# Patient Record
Sex: FEMALE | Race: WHITE | NOT HISPANIC OR LATINO | Employment: UNEMPLOYED | URBAN - METROPOLITAN AREA
[De-identification: names, ages, dates, MRNs, and addresses within clinical notes are randomized per-mention and may not be internally consistent; named-entity substitution may affect disease eponyms.]

---

## 2022-12-19 RX ORDER — LITHIUM CARBONATE 150 MG/1
300 CAPSULE ORAL AT BEDTIME
COMMUNITY
Start: 2022-09-07

## 2022-12-19 RX ORDER — BUPROPION HYDROCHLORIDE 300 MG/1
300 TABLET ORAL EVERY MORNING
COMMUNITY
Start: 2022-10-04

## 2022-12-19 RX ORDER — OXCARBAZEPINE 150 MG/1
150 TABLET, FILM COATED ORAL AT BEDTIME
COMMUNITY
Start: 2022-10-04

## 2022-12-19 RX ORDER — CYCLOBENZAPRINE HCL 10 MG
10 TABLET ORAL 3 TIMES DAILY PRN
Status: ON HOLD | COMMUNITY
Start: 2022-04-01 | End: 2022-12-25

## 2022-12-19 RX ORDER — LAMOTRIGINE 100 MG/1
50 TABLET ORAL AT BEDTIME
COMMUNITY
Start: 2022-12-01

## 2022-12-19 RX ORDER — DEXTROAMPHETAMINE SACCHARATE, AMPHETAMINE ASPARTATE MONOHYDRATE, DEXTROAMPHETAMINE SULFATE AND AMPHETAMINE SULFATE 5; 5; 5; 5 MG/1; MG/1; MG/1; MG/1
20 CAPSULE, EXTENDED RELEASE ORAL EVERY MORNING
COMMUNITY
Start: 2022-09-07

## 2022-12-19 RX ORDER — GABAPENTIN 300 MG/1
300 CAPSULE ORAL DAILY PRN
COMMUNITY
Start: 2021-12-03

## 2022-12-19 RX ORDER — HYDROCODONE BITARTRATE AND ACETAMINOPHEN 7.5; 325 MG/1; MG/1
1 TABLET ORAL 4 TIMES DAILY
Status: ON HOLD | COMMUNITY
Start: 2022-11-04 | End: 2022-12-25

## 2022-12-19 RX ORDER — DIAZEPAM 5 MG
TABLET ORAL
Status: ON HOLD | COMMUNITY
Start: 2022-04-19 | End: 2022-12-23

## 2022-12-19 RX ORDER — BUPROPION HYDROCHLORIDE 150 MG/1
150 TABLET ORAL EVERY MORNING
COMMUNITY
Start: 2022-12-01

## 2022-12-20 ENCOUNTER — TRANSFERRED RECORDS (OUTPATIENT)
Dept: MULTI SPECIALTY CLINIC | Facility: CLINIC | Age: 42
End: 2022-12-20

## 2022-12-20 LAB
ALT SERPL-CCNC: 24 U/L
AST SERPL-CCNC: 24 U/L (ref 14–36)
CREATININE (EXTERNAL): 0.8 MG/DL (ref 0.7–1.4)
GFR ESTIMATED (EXTERNAL): >60 ML/MIN/1.73M2
GLUCOSE (EXTERNAL): 125 MG/DL (ref 60–99)
POTASSIUM (EXTERNAL): 3.7 MMOL/L (ref 3.5–5.1)

## 2022-12-22 ENCOUNTER — HOSPITAL ENCOUNTER (INPATIENT)
Facility: CLINIC | Age: 42
LOS: 5 days | Discharge: HOME | End: 2022-12-27
Attending: ORTHOPAEDIC SURGERY | Admitting: ORTHOPAEDIC SURGERY
Payer: MEDICAID

## 2022-12-22 ENCOUNTER — ANESTHESIA EVENT (OUTPATIENT)
Dept: SURGERY | Facility: CLINIC | Age: 42
End: 2022-12-22
Payer: MEDICAID

## 2022-12-22 ENCOUNTER — ANESTHESIA (OUTPATIENT)
Dept: SURGERY | Facility: CLINIC | Age: 42
End: 2022-12-22
Payer: MEDICAID

## 2022-12-22 ENCOUNTER — APPOINTMENT (OUTPATIENT)
Dept: RADIOLOGY | Facility: CLINIC | Age: 42
End: 2022-12-22
Attending: ORTHOPAEDIC SURGERY
Payer: MEDICAID

## 2022-12-22 DIAGNOSIS — M48.02 CERVICAL STENOSIS OF SPINAL CANAL: Primary | ICD-10-CM

## 2022-12-22 PROCEDURE — 278N000051 HC OR IMPLANT GENERAL: Performed by: ORTHOPAEDIC SURGERY

## 2022-12-22 PROCEDURE — 120N000001 HC R&B MED SURG/OB

## 2022-12-22 PROCEDURE — 999N000182 XR SURGERY CARM FLUORO GREATER THAN 5 MIN: Mod: TC

## 2022-12-22 PROCEDURE — 370N000017 HC ANESTHESIA TECHNICAL FEE, PER MIN: Performed by: ORTHOPAEDIC SURGERY

## 2022-12-22 PROCEDURE — 250N000005 HC OR RX SURGIFLO HEMOSTATIC MATRIX 10ML 199102S OPNP: Performed by: ORTHOPAEDIC SURGERY

## 2022-12-22 PROCEDURE — 250N000011 HC RX IP 250 OP 636

## 2022-12-22 PROCEDURE — 250N000011 HC RX IP 250 OP 636: Performed by: NURSE ANESTHETIST, CERTIFIED REGISTERED

## 2022-12-22 PROCEDURE — 00NW0ZZ RELEASE CERVICAL SPINAL CORD, OPEN APPROACH: ICD-10-PCS | Performed by: ORTHOPAEDIC SURGERY

## 2022-12-22 PROCEDURE — 250N000025 HC SEVOFLURANE, PER MIN: Performed by: ORTHOPAEDIC SURGERY

## 2022-12-22 PROCEDURE — L8699 PROSTHETIC IMPLANT NOS: HCPCS | Performed by: ORTHOPAEDIC SURGERY

## 2022-12-22 PROCEDURE — 250N000009 HC RX 250: Performed by: ANESTHESIOLOGY

## 2022-12-22 PROCEDURE — 999N000141 HC STATISTIC PRE-PROCEDURE NURSING ASSESSMENT: Performed by: ORTHOPAEDIC SURGERY

## 2022-12-22 PROCEDURE — 0RT30ZZ RESECTION OF CERVICAL VERTEBRAL DISC, OPEN APPROACH: ICD-10-PCS | Performed by: ORTHOPAEDIC SURGERY

## 2022-12-22 PROCEDURE — 250N000009 HC RX 250: Performed by: NURSE ANESTHETIST, CERTIFIED REGISTERED

## 2022-12-22 PROCEDURE — 250N000013 HC RX MED GY IP 250 OP 250 PS 637

## 2022-12-22 PROCEDURE — 272N000001 HC OR GENERAL SUPPLY STERILE: Performed by: ORTHOPAEDIC SURGERY

## 2022-12-22 PROCEDURE — 710N000010 HC RECOVERY PHASE 1, LEVEL 2, PER MIN: Performed by: ORTHOPAEDIC SURGERY

## 2022-12-22 PROCEDURE — 250N000009 HC RX 250: Performed by: ORTHOPAEDIC SURGERY

## 2022-12-22 PROCEDURE — 01N10ZZ RELEASE CERVICAL NERVE, OPEN APPROACH: ICD-10-PCS | Performed by: ORTHOPAEDIC SURGERY

## 2022-12-22 PROCEDURE — 0RG10K0 FUSION OF CERVICAL VERTEBRAL JOINT WITH NONAUTOLOGOUS TISSUE SUBSTITUTE, ANTERIOR APPROACH, ANTERIOR COLUMN, OPEN APPROACH: ICD-10-PCS | Performed by: ORTHOPAEDIC SURGERY

## 2022-12-22 PROCEDURE — 258N000003 HC RX IP 258 OP 636: Performed by: NURSE ANESTHETIST, CERTIFIED REGISTERED

## 2022-12-22 PROCEDURE — C1713 ANCHOR/SCREW BN/BN,TIS/BN: HCPCS | Performed by: ORTHOPAEDIC SURGERY

## 2022-12-22 PROCEDURE — 258N000003 HC RX IP 258 OP 636: Performed by: ANESTHESIOLOGY

## 2022-12-22 PROCEDURE — 250N000009 HC RX 250

## 2022-12-22 PROCEDURE — 258N000003 HC RX IP 258 OP 636

## 2022-12-22 PROCEDURE — 250N000011 HC RX IP 250 OP 636: Performed by: ANESTHESIOLOGY

## 2022-12-22 PROCEDURE — 360N000084 HC SURGERY LEVEL 4 W/ FLUORO, PER MIN: Performed by: ORTHOPAEDIC SURGERY

## 2022-12-22 DEVICE — I-FACTOR PUTTY, 1.0CC SYRINGE
Type: IMPLANTABLE DEVICE | Site: SPINE CERVICAL | Status: FUNCTIONAL
Brand: I-FACTOR PEPTIDE ENHANCED BONE GRAFT

## 2022-12-22 DEVICE — IMPLANTABLE DEVICE: Type: IMPLANTABLE DEVICE | Site: SPINE CERVICAL | Status: FUNCTIONAL

## 2022-12-22 RX ORDER — SCOLOPAMINE TRANSDERMAL SYSTEM 1 MG/1
1 PATCH, EXTENDED RELEASE TRANSDERMAL
Status: DISCONTINUED | OUTPATIENT
Start: 2022-12-22 | End: 2022-12-22 | Stop reason: HOSPADM

## 2022-12-22 RX ORDER — SODIUM CHLORIDE, SODIUM LACTATE, POTASSIUM CHLORIDE, CALCIUM CHLORIDE 600; 310; 30; 20 MG/100ML; MG/100ML; MG/100ML; MG/100ML
INJECTION, SOLUTION INTRAVENOUS CONTINUOUS
Status: DISCONTINUED | OUTPATIENT
Start: 2022-12-22 | End: 2022-12-22 | Stop reason: HOSPADM

## 2022-12-22 RX ORDER — DEXAMETHASONE SODIUM PHOSPHATE 10 MG/ML
INJECTION, SOLUTION INTRAMUSCULAR; INTRAVENOUS PRN
Status: DISCONTINUED | OUTPATIENT
Start: 2022-12-22 | End: 2022-12-22

## 2022-12-22 RX ORDER — ACETAMINOPHEN 325 MG/1
650 TABLET ORAL EVERY 4 HOURS PRN
Status: DISCONTINUED | OUTPATIENT
Start: 2022-12-25 | End: 2022-12-25

## 2022-12-22 RX ORDER — OXYCODONE HYDROCHLORIDE 5 MG/1
10 TABLET ORAL EVERY 4 HOURS PRN
Status: DISCONTINUED | OUTPATIENT
Start: 2022-12-22 | End: 2022-12-23

## 2022-12-22 RX ORDER — FENTANYL CITRATE 50 UG/ML
25 INJECTION, SOLUTION INTRAMUSCULAR; INTRAVENOUS EVERY 5 MIN PRN
Status: DISCONTINUED | OUTPATIENT
Start: 2022-12-22 | End: 2022-12-22 | Stop reason: HOSPADM

## 2022-12-22 RX ORDER — HYDROMORPHONE HCL IN WATER/PF 6 MG/30 ML
0.2 PATIENT CONTROLLED ANALGESIA SYRINGE INTRAVENOUS
Status: DISCONTINUED | OUTPATIENT
Start: 2022-12-22 | End: 2022-12-27 | Stop reason: HOSPADM

## 2022-12-22 RX ORDER — METHOCARBAMOL 750 MG/1
750 TABLET, FILM COATED ORAL EVERY 6 HOURS PRN
Status: DISCONTINUED | OUTPATIENT
Start: 2022-12-22 | End: 2022-12-27 | Stop reason: HOSPADM

## 2022-12-22 RX ORDER — ONDANSETRON 2 MG/ML
4 INJECTION INTRAMUSCULAR; INTRAVENOUS EVERY 6 HOURS PRN
Status: DISCONTINUED | OUTPATIENT
Start: 2022-12-22 | End: 2022-12-27 | Stop reason: HOSPADM

## 2022-12-22 RX ORDER — NALOXONE HYDROCHLORIDE 0.4 MG/ML
0.2 INJECTION, SOLUTION INTRAMUSCULAR; INTRAVENOUS; SUBCUTANEOUS
Status: DISCONTINUED | OUTPATIENT
Start: 2022-12-22 | End: 2022-12-27 | Stop reason: HOSPADM

## 2022-12-22 RX ORDER — MEPERIDINE HYDROCHLORIDE 25 MG/ML
12.5 INJECTION INTRAMUSCULAR; INTRAVENOUS; SUBCUTANEOUS
Status: DISCONTINUED | OUTPATIENT
Start: 2022-12-22 | End: 2022-12-22 | Stop reason: HOSPADM

## 2022-12-22 RX ORDER — FENTANYL CITRATE 50 UG/ML
25 INJECTION, SOLUTION INTRAMUSCULAR; INTRAVENOUS
Status: DISCONTINUED | OUTPATIENT
Start: 2022-12-22 | End: 2022-12-22 | Stop reason: HOSPADM

## 2022-12-22 RX ORDER — ONDANSETRON 2 MG/ML
4 INJECTION INTRAMUSCULAR; INTRAVENOUS EVERY 30 MIN PRN
Status: DISCONTINUED | OUTPATIENT
Start: 2022-12-22 | End: 2022-12-22 | Stop reason: HOSPADM

## 2022-12-22 RX ORDER — FENTANYL CITRATE 50 UG/ML
INJECTION, SOLUTION INTRAMUSCULAR; INTRAVENOUS PRN
Status: DISCONTINUED | OUTPATIENT
Start: 2022-12-22 | End: 2022-12-22

## 2022-12-22 RX ORDER — FENTANYL CITRATE 50 UG/ML
50 INJECTION, SOLUTION INTRAMUSCULAR; INTRAVENOUS EVERY 5 MIN PRN
Status: DISCONTINUED | OUTPATIENT
Start: 2022-12-22 | End: 2022-12-22 | Stop reason: HOSPADM

## 2022-12-22 RX ORDER — PROPOFOL 10 MG/ML
INJECTION, EMULSION INTRAVENOUS PRN
Status: DISCONTINUED | OUTPATIENT
Start: 2022-12-22 | End: 2022-12-22

## 2022-12-22 RX ORDER — GABAPENTIN 300 MG/1
300 CAPSULE ORAL
Status: COMPLETED | OUTPATIENT
Start: 2022-12-22 | End: 2022-12-22

## 2022-12-22 RX ORDER — CEFAZOLIN SODIUM/WATER 2 G/20 ML
2 SYRINGE (ML) INTRAVENOUS
Status: COMPLETED | OUTPATIENT
Start: 2022-12-22 | End: 2022-12-22

## 2022-12-22 RX ORDER — SODIUM CHLORIDE 9 MG/ML
INJECTION, SOLUTION INTRAVENOUS CONTINUOUS
Status: DISCONTINUED | OUTPATIENT
Start: 2022-12-22 | End: 2022-12-27 | Stop reason: HOSPADM

## 2022-12-22 RX ORDER — BISACODYL 10 MG
10 SUPPOSITORY, RECTAL RECTAL DAILY PRN
Status: DISCONTINUED | OUTPATIENT
Start: 2022-12-22 | End: 2022-12-27 | Stop reason: HOSPADM

## 2022-12-22 RX ORDER — ACETAMINOPHEN 325 MG/1
975 TABLET ORAL ONCE
Status: COMPLETED | OUTPATIENT
Start: 2022-12-22 | End: 2022-12-22

## 2022-12-22 RX ORDER — OXYCODONE HYDROCHLORIDE 5 MG/1
5 TABLET ORAL EVERY 4 HOURS PRN
Status: DISCONTINUED | OUTPATIENT
Start: 2022-12-22 | End: 2022-12-23

## 2022-12-22 RX ORDER — ONDANSETRON 4 MG/1
4 TABLET, ORALLY DISINTEGRATING ORAL EVERY 6 HOURS PRN
Status: DISCONTINUED | OUTPATIENT
Start: 2022-12-22 | End: 2022-12-27 | Stop reason: HOSPADM

## 2022-12-22 RX ORDER — LIDOCAINE HYDROCHLORIDE 10 MG/ML
INJECTION, SOLUTION INFILTRATION; PERINEURAL PRN
Status: DISCONTINUED | OUTPATIENT
Start: 2022-12-22 | End: 2022-12-22

## 2022-12-22 RX ORDER — PROCHLORPERAZINE MALEATE 10 MG
10 TABLET ORAL EVERY 6 HOURS PRN
Status: DISCONTINUED | OUTPATIENT
Start: 2022-12-22 | End: 2022-12-27 | Stop reason: HOSPADM

## 2022-12-22 RX ORDER — ACETAMINOPHEN 325 MG/1
975 TABLET ORAL EVERY 8 HOURS
Status: COMPLETED | OUTPATIENT
Start: 2022-12-22 | End: 2022-12-25

## 2022-12-22 RX ORDER — NALOXONE HYDROCHLORIDE 0.4 MG/ML
0.4 INJECTION, SOLUTION INTRAMUSCULAR; INTRAVENOUS; SUBCUTANEOUS
Status: DISCONTINUED | OUTPATIENT
Start: 2022-12-22 | End: 2022-12-27 | Stop reason: HOSPADM

## 2022-12-22 RX ORDER — HYDROMORPHONE HCL IN WATER/PF 6 MG/30 ML
0.4 PATIENT CONTROLLED ANALGESIA SYRINGE INTRAVENOUS
Status: DISCONTINUED | OUTPATIENT
Start: 2022-12-22 | End: 2022-12-23

## 2022-12-22 RX ORDER — ONDANSETRON 2 MG/ML
INJECTION INTRAMUSCULAR; INTRAVENOUS PRN
Status: DISCONTINUED | OUTPATIENT
Start: 2022-12-22 | End: 2022-12-22

## 2022-12-22 RX ORDER — HYDROMORPHONE HCL IN WATER/PF 6 MG/30 ML
0.4 PATIENT CONTROLLED ANALGESIA SYRINGE INTRAVENOUS EVERY 5 MIN PRN
Status: DISCONTINUED | OUTPATIENT
Start: 2022-12-22 | End: 2022-12-22 | Stop reason: HOSPADM

## 2022-12-22 RX ORDER — CEFAZOLIN SODIUM 1 G/3ML
1 INJECTION, POWDER, FOR SOLUTION INTRAMUSCULAR; INTRAVENOUS EVERY 8 HOURS
Status: COMPLETED | OUTPATIENT
Start: 2022-12-22 | End: 2022-12-23

## 2022-12-22 RX ORDER — ONDANSETRON 4 MG/1
4 TABLET, ORALLY DISINTEGRATING ORAL EVERY 30 MIN PRN
Status: DISCONTINUED | OUTPATIENT
Start: 2022-12-22 | End: 2022-12-22 | Stop reason: HOSPADM

## 2022-12-22 RX ORDER — DEXMEDETOMIDINE HYDROCHLORIDE 4 UG/ML
.2-1 INJECTION, SOLUTION INTRAVENOUS CONTINUOUS
Status: DISCONTINUED | OUTPATIENT
Start: 2022-12-22 | End: 2022-12-22 | Stop reason: HOSPADM

## 2022-12-22 RX ORDER — HYDROMORPHONE HCL IN WATER/PF 6 MG/30 ML
0.2 PATIENT CONTROLLED ANALGESIA SYRINGE INTRAVENOUS EVERY 5 MIN PRN
Status: DISCONTINUED | OUTPATIENT
Start: 2022-12-22 | End: 2022-12-22 | Stop reason: HOSPADM

## 2022-12-22 RX ORDER — AMOXICILLIN 250 MG
1 CAPSULE ORAL 2 TIMES DAILY
Status: DISCONTINUED | OUTPATIENT
Start: 2022-12-22 | End: 2022-12-27 | Stop reason: HOSPADM

## 2022-12-22 RX ORDER — PROPOFOL 10 MG/ML
INJECTION, EMULSION INTRAVENOUS CONTINUOUS PRN
Status: DISCONTINUED | OUTPATIENT
Start: 2022-12-22 | End: 2022-12-22

## 2022-12-22 RX ORDER — MAGNESIUM SULFATE 4 G/50ML
4 INJECTION INTRAVENOUS ONCE
Status: COMPLETED | OUTPATIENT
Start: 2022-12-22 | End: 2022-12-22

## 2022-12-22 RX ORDER — LIDOCAINE 40 MG/G
CREAM TOPICAL
Status: DISCONTINUED | OUTPATIENT
Start: 2022-12-22 | End: 2022-12-22 | Stop reason: HOSPADM

## 2022-12-22 RX ORDER — MULTIVITAMIN,THERAPEUTIC
1 TABLET ORAL DAILY
Status: DISCONTINUED | OUTPATIENT
Start: 2022-12-23 | End: 2022-12-27 | Stop reason: HOSPADM

## 2022-12-22 RX ORDER — HYDROXYZINE HYDROCHLORIDE 25 MG/1
25 TABLET, FILM COATED ORAL EVERY 6 HOURS PRN
Status: DISCONTINUED | OUTPATIENT
Start: 2022-12-22 | End: 2022-12-27 | Stop reason: HOSPADM

## 2022-12-22 RX ORDER — POLYETHYLENE GLYCOL 3350 17 G/17G
17 POWDER, FOR SOLUTION ORAL DAILY
Status: DISCONTINUED | OUTPATIENT
Start: 2022-12-23 | End: 2022-12-27 | Stop reason: HOSPADM

## 2022-12-22 RX ORDER — LORATADINE 10 MG/1
10 TABLET ORAL DAILY PRN
Status: DISCONTINUED | OUTPATIENT
Start: 2022-12-22 | End: 2022-12-27 | Stop reason: HOSPADM

## 2022-12-22 RX ORDER — CEFAZOLIN SODIUM/WATER 2 G/20 ML
2 SYRINGE (ML) INTRAVENOUS SEE ADMIN INSTRUCTIONS
Status: DISCONTINUED | OUTPATIENT
Start: 2022-12-22 | End: 2022-12-22 | Stop reason: HOSPADM

## 2022-12-22 RX ADMIN — PHENYLEPHRINE HYDROCHLORIDE 100 MCG: 10 INJECTION INTRAVENOUS at 13:31

## 2022-12-22 RX ADMIN — SENNOSIDES AND DOCUSATE SODIUM 1 TABLET: 50; 8.6 TABLET ORAL at 20:15

## 2022-12-22 RX ADMIN — ACETAMINOPHEN 975 MG: 325 TABLET ORAL at 10:20

## 2022-12-22 RX ADMIN — GABAPENTIN 300 MG: 300 CAPSULE ORAL at 10:20

## 2022-12-22 RX ADMIN — ROCURONIUM BROMIDE 40 MG: 10 INJECTION, SOLUTION INTRAVENOUS at 12:47

## 2022-12-22 RX ADMIN — DEXAMETHASONE SODIUM PHOSPHATE 10 MG: 10 INJECTION, SOLUTION INTRAMUSCULAR; INTRAVENOUS at 12:47

## 2022-12-22 RX ADMIN — MIDAZOLAM 2 MG: 1 INJECTION INTRAMUSCULAR; INTRAVENOUS at 12:40

## 2022-12-22 RX ADMIN — OXYCODONE HYDROCHLORIDE 5 MG: 5 TABLET ORAL at 21:29

## 2022-12-22 RX ADMIN — FENTANYL CITRATE 50 MCG: 50 INJECTION, SOLUTION INTRAMUSCULAR; INTRAVENOUS at 15:19

## 2022-12-22 RX ADMIN — HYDROMORPHONE HYDROCHLORIDE 0.4 MG: 0.2 INJECTION, SOLUTION INTRAMUSCULAR; INTRAVENOUS; SUBCUTANEOUS at 23:42

## 2022-12-22 RX ADMIN — ACETAMINOPHEN 975 MG: 325 TABLET ORAL at 17:29

## 2022-12-22 RX ADMIN — SCOPALAMINE 1 PATCH: 1 PATCH, EXTENDED RELEASE TRANSDERMAL at 10:44

## 2022-12-22 RX ADMIN — FENTANYL CITRATE 50 MCG: 50 INJECTION, SOLUTION INTRAMUSCULAR; INTRAVENOUS at 15:41

## 2022-12-22 RX ADMIN — ROCURONIUM BROMIDE 30 MG: 10 INJECTION, SOLUTION INTRAVENOUS at 13:16

## 2022-12-22 RX ADMIN — TRANEXAMIC ACID 1 G: 1 INJECTION, SOLUTION INTRAVENOUS at 12:55

## 2022-12-22 RX ADMIN — FENTANYL CITRATE 25 MCG: 50 INJECTION, SOLUTION INTRAMUSCULAR; INTRAVENOUS at 15:29

## 2022-12-22 RX ADMIN — SODIUM CHLORIDE, POTASSIUM CHLORIDE, SODIUM LACTATE AND CALCIUM CHLORIDE: 600; 310; 30; 20 INJECTION, SOLUTION INTRAVENOUS at 13:40

## 2022-12-22 RX ADMIN — HYDROMORPHONE HYDROCHLORIDE 0.5 MG: 1 INJECTION, SOLUTION INTRAMUSCULAR; INTRAVENOUS; SUBCUTANEOUS at 14:16

## 2022-12-22 RX ADMIN — ROCURONIUM BROMIDE 30 MG: 10 INJECTION, SOLUTION INTRAVENOUS at 14:00

## 2022-12-22 RX ADMIN — PHENYLEPHRINE HYDROCHLORIDE 100 MCG: 10 INJECTION INTRAVENOUS at 13:33

## 2022-12-22 RX ADMIN — SODIUM CHLORIDE: 9 INJECTION, SOLUTION INTRAVENOUS at 17:32

## 2022-12-22 RX ADMIN — CEFAZOLIN 1 G: 1 INJECTION, POWDER, FOR SOLUTION INTRAMUSCULAR; INTRAVENOUS at 20:19

## 2022-12-22 RX ADMIN — FENTANYL CITRATE 100 MCG: 50 INJECTION, SOLUTION INTRAMUSCULAR; INTRAVENOUS at 12:47

## 2022-12-22 RX ADMIN — Medication 2 G: at 12:40

## 2022-12-22 RX ADMIN — HYDROMORPHONE HYDROCHLORIDE 0.4 MG: 0.2 INJECTION, SOLUTION INTRAMUSCULAR; INTRAVENOUS; SUBCUTANEOUS at 19:09

## 2022-12-22 RX ADMIN — HYDROMORPHONE HYDROCHLORIDE 0.4 MG: 0.2 INJECTION, SOLUTION INTRAMUSCULAR; INTRAVENOUS; SUBCUTANEOUS at 15:51

## 2022-12-22 RX ADMIN — PHENYLEPHRINE HYDROCHLORIDE 100 MCG: 10 INJECTION INTRAVENOUS at 13:37

## 2022-12-22 RX ADMIN — DEXMEDETOMIDINE HYDROCHLORIDE 0.4 MCG/KG/HR: 400 INJECTION INTRAVENOUS at 12:47

## 2022-12-22 RX ADMIN — HYDROMORPHONE HYDROCHLORIDE 0.5 MG: 1 INJECTION, SOLUTION INTRAMUSCULAR; INTRAVENOUS; SUBCUTANEOUS at 14:30

## 2022-12-22 RX ADMIN — MAGNESIUM SULFATE HEPTAHYDRATE 4 G: 4 INJECTION, SOLUTION INTRAVENOUS at 10:40

## 2022-12-22 RX ADMIN — LIDOCAINE HYDROCHLORIDE 20 MG: 10 INJECTION, SOLUTION INFILTRATION; PERINEURAL at 12:47

## 2022-12-22 RX ADMIN — PROPOFOL 150 MG: 10 INJECTION, EMULSION INTRAVENOUS at 12:47

## 2022-12-22 RX ADMIN — SODIUM CHLORIDE, POTASSIUM CHLORIDE, SODIUM LACTATE AND CALCIUM CHLORIDE: 600; 310; 30; 20 INJECTION, SOLUTION INTRAVENOUS at 10:32

## 2022-12-22 RX ADMIN — ONDANSETRON 4 MG: 2 INJECTION INTRAMUSCULAR; INTRAVENOUS at 14:30

## 2022-12-22 RX ADMIN — PROPOFOL 50 MCG/KG/MIN: 10 INJECTION, EMULSION INTRAVENOUS at 12:55

## 2022-12-22 RX ADMIN — BENZOCAINE AND MENTHOL 1 LOZENGE: 15; 3.6 LOZENGE ORAL at 21:31

## 2022-12-22 RX ADMIN — PHENYLEPHRINE HYDROCHLORIDE 100 MCG: 10 INJECTION INTRAVENOUS at 13:35

## 2022-12-22 RX ADMIN — PHENYLEPHRINE HYDROCHLORIDE 0.3 MCG/KG/MIN: 10 INJECTION INTRAVENOUS at 13:38

## 2022-12-22 ASSESSMENT — ACTIVITIES OF DAILY LIVING (ADL)
ADLS_ACUITY_SCORE: 18
ADLS_ACUITY_SCORE: 33

## 2022-12-22 NOTE — ANESTHESIA POSTPROCEDURE EVALUATION
Patient: Yane Delgado    Procedure: Procedure(s):  CERVICAL 5-6 ANTERIOR CERVICAL DECOMPRESSION AND FUSION       Anesthesia Type:  General    Note:  Disposition: Outpatient   Postop Pain Control: Uneventful            Sign Out: Well controlled pain   PONV: No   Neuro/Psych: Uneventful            Sign Out: Acceptable/Baseline neuro status   Airway/Respiratory: Uneventful            Sign Out: Acceptable/Baseline resp. status   CV/Hemodynamics: Uneventful            Sign Out: Acceptable CV status; No obvious hypovolemia; No obvious fluid overload   Other NRE: NONE   DID A NON-ROUTINE EVENT OCCUR? No           Last vitals:  Vitals Value Taken Time   /59 12/22/22 1605   Temp 36.4  C (97.6  F) 12/22/22 1600   Pulse 92 12/22/22 1608   Resp 18 12/22/22 1606   SpO2 96 % 12/22/22 1608   Vitals shown include unvalidated device data.    Electronically Signed By: Robbie Diallo MD  December 22, 2022  4:42 PM

## 2022-12-22 NOTE — ANESTHESIA CARE TRANSFER NOTE
Patient: Yane Delgado    Procedure: Procedure(s):  CERVICAL 5-6 ANTERIOR CERVICAL DECOMPRESSION AND FUSION       Diagnosis: Cervical radicular pain [M54.12]  Diagnosis Additional Information: No value filed.    Anesthesia Type:   General     Note:    Oropharynx: oropharynx clear of all foreign objects  Level of Consciousness: awake  Oxygen Supplementation: face mask  Level of Supplemental Oxygen (L/min / FiO2): 6  Independent Airway: airway patency satisfactory and stable  Dentition: dentition unchanged  Vital Signs Stable: post-procedure vital signs reviewed and stable  Report to RN Given: handoff report given  Patient transferred to: PACU    Handoff Report: Identifed the Patient, Identified the Reponsible Provider, Reviewed the pertinent medical history, Discussed the surgical course, Reviewed Intra-OP anesthesia mangement and issues during anesthesia, Set expectations for post-procedure period and Allowed opportunity for questions and acknowledgement of understanding      Vitals:  Vitals Value Taken Time   /58 12/22/22 1450   Temp 36.4  C (97.6  F) 12/22/22 1449   Pulse 90 12/22/22 1450   Resp 15 12/22/22 1450   SpO2 98 % 12/22/22 1450   Vitals shown include unvalidated device data.    Electronically Signed By: JULIET Daigle CRNA  December 22, 2022  2:52 PM

## 2022-12-22 NOTE — ANESTHESIA PROCEDURE NOTES
Airway       Patient location during procedure: OR       Procedure Start/Stop Times: 12/22/2022 12:50 PM and 12/22/2022 1:52 PM  Staff -        CRNA: Chris Herndon APRN CRNA       Performed By: CRNA  Consent for Airway        Urgency: elective  Indications and Patient Condition       Indications for airway management: nikky-procedural       Induction type:intravenous       Mask difficulty assessment: 1 - vent by mask    Final Airway Details       Final airway type: endotracheal airway       Successful airway: ETT - single  Endotracheal Airway Details        ETT size (mm): 7.0       Cuffed: yes       Successful intubation technique: direct laryngoscopy       DL Blade Type: Wyatt 2       Grade View of Cords: 1       Adjucts: stylet       Position: Right       Measured from: lips       Secured at (cm): 22       Bite block used: Soft    Post intubation assessment        Placement verified by: capnometry, equal breath sounds and chest rise        Number of attempts at approach: 1       Secured with: silk tape       Ease of procedure: easy       Dentition: Dental injury       Dental guard used and removed. Dental Guard Type: Proguard Red.    Medication(s) Administered   Medication Administration Time: 12/22/2022 12:50 PM
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
## 2022-12-22 NOTE — OP NOTE
DATE OF SERVICE: 12/22/2022        PREOPERATIVE DIAGNOSES:  1.  Multilevel degenerative disk disease cervical spine.  2.  Severe right foraminal stenosis at C5-6  3.  Failure of conservative measures.     POSTOPERATIVE DIAGNOSES:  Same     PROCEDURES:  1.  Left-sided Foster Frank anterior approach to cervical spine  2.  Complete block diskectomy at C5-6 with bilateral uncovertebral resections and complete decompression of all exiting nerves and the spinal cord.  3.  Anterior cervical fusion C5-6.  4.  Placement of a Saber-C graft 6 mm x 12 mm x 15 mm into the disk space at C5-6  5.  Placement of separate fixation plate at C5-6.  This serves as a separate plate code ([id number])     SURGEON:  Dr. Tera Angeles     ASSISTANT:  Elizabet Ellington PA-C who was needed for patient positioning, soft tissue retraction, patient safety and assistance with closure of the wound.  She was vital in the protection of the vessels, esophagus, and other soft tissues.       ESTIMATED BLOOD LOSS:  10 mL.     DRAINS:  None.     COMPLICATIONS:  None perceived.     SPECIMENS SENT:  None.     HISTORY OF PRESENT ILLNESS AND INDICATIONS FOR PROCEDURE:  This is a pleasant 42 year old female with signs and symptoms of severe right shoulder and arm pain.  She elected to proceed with surgery.          Therefore, the patient was seen in the preop area of [facility] today. The   neck was marked and the consent was again reverified.  She was brought to the operating room, intubated via general endotracheal anesthetic and placed on a flat top table with care taken to pad all bony prominences.  Pause for the Cause was performed correctly identifying the patient, procedure, proposed plan and radiographs and all were in agreement.  We then localized our incision so as not to cause any iatrogenic tissue damage and performed a left-sided Foster    Frank anterior approach to cervical spine.  We dissected down over the C5 and C6  vertebral  bodies taking care to protect the disk spaces above.  We then placed  our self-retaining retractors and began with our complete block diskectomy at C5-6.   There was a severe amount of foraminal stenosis on the right.  We removed this and  dissected all the way back to the level of the spinal cord behind the posterior longitudinal ligament.  We made certain there was no continued neural compression. We then gently decorticated the endplates making sure not to dig too deeply so that we would make certain to avoid any subsidence.  We then placed the Saber-C graft 6 mm height cortical cancellous allograft.  Separate plate construct was placed over the top of titanium spikes.       The patient tolerated procedure well.  There were no apparent complications.  Patient will be weightbearing as tolerated bilateral lower extremities with strict instructions to avoid lifting more than a full gallon of milk over the next 6 weeks.  She will have early mobilization and HALINA stockings for DVT prophylaxis and 2 grams of Ancef IV q.8 hours x2 doses for antibiotics. Return to see me in 6 weeks, sooner if there are any problems, questions or concerns.  She can have a soft collar for her comfort.

## 2022-12-22 NOTE — PHARMACY-ADMISSION MEDICATION HISTORY
Pharmacy Note - Admission Medication History    Pertinent Provider Information:    ______________________________________________________________________    Prior To Admission (PTA) med list completed and updated in EMR.       PTA Med List   Medication Sig Last Dose     amphetamine-dextroamphetamine (ADDERALL XR) 20 MG 24 hr capsule Take 20 mg by mouth every morning 12/21/2022 at am     buPROPion (WELLBUTRIN XL) 150 MG 24 hr tablet Take 150 mg by mouth every morning Give with 300mg tab for total dose of 450mg 12/22/2022 at 0630     buPROPion (WELLBUTRIN XL) 300 MG 24 hr tablet 300 mg every morning Give with 150mg tab for total dose of 450mg 12/22/2022 at 0630     cyclobenzaprine (FLEXERIL) 10 MG tablet Take 10 mg by mouth 3 times daily as needed 12/18/2022     diazepam (VALIUM) 5 MG tablet Take 1 tab 60 minutes before procedure.  If no affect 15 minutes before MRI, take 2nd tab More than a month     gabapentin (NEURONTIN) 300 MG capsule Take 300 mg by mouth daily as needed 12/15/2022     HYDROcodone-acetaminophen (NORCO) 7.5-325 MG per tablet Take 1 tablet by mouth 4 times daily 12/22/2022 at 0630     lamoTRIgine (LAMICTAL) 100 MG tablet Take 50 mg by mouth At Bedtime 12/21/2022 at pm     lithium (ESKALITH) 150 MG capsule Take 300 mg by mouth At Bedtime 12/19/2022     norgestrel-ethinyl estradiol (LO/OVRAL) 0.3-30 MG-MCG tablet Take 1 tablet by mouth daily 12/21/2022 at pm     OXcarbazepine (TRILEPTAL) 150 MG tablet Take 150 mg by mouth At Bedtime 12/21/2022 at pm       Information source(s): Patient and CareEverywhere/SureScripts    Method of interview communication: in-person    Patient was asked about OTC/herbal products specifically.  PTA med list reflects this.    Based on the pharmacist's assessment, the PTA med list information appears reliable    Allergies were reviewed, assessed, and updated with the patient.      Patient does not anticipate needing any multi-use medications during admission.     Thank you  for the opportunity to participate in the care of this patient.      Amy Willams, Pharm D, BCPS     12/22/2022     10:29 AM

## 2022-12-23 ENCOUNTER — APPOINTMENT (OUTPATIENT)
Dept: PHYSICAL THERAPY | Facility: CLINIC | Age: 42
End: 2022-12-23
Payer: MEDICAID

## 2022-12-23 LAB — HGB BLD-MCNC: 11.2 G/DL (ref 11.7–15.7)

## 2022-12-23 PROCEDURE — 85018 HEMOGLOBIN: CPT

## 2022-12-23 PROCEDURE — 97161 PT EVAL LOW COMPLEX 20 MIN: CPT | Mod: GP

## 2022-12-23 PROCEDURE — 36415 COLL VENOUS BLD VENIPUNCTURE: CPT

## 2022-12-23 PROCEDURE — 97116 GAIT TRAINING THERAPY: CPT | Mod: GP

## 2022-12-23 PROCEDURE — 250N000011 HC RX IP 250 OP 636

## 2022-12-23 PROCEDURE — 250N000013 HC RX MED GY IP 250 OP 250 PS 637

## 2022-12-23 PROCEDURE — 120N000001 HC R&B MED SURG/OB

## 2022-12-23 PROCEDURE — 250N000013 HC RX MED GY IP 250 OP 250 PS 637: Performed by: ORTHOPAEDIC SURGERY

## 2022-12-23 RX ORDER — LITHIUM CARBONATE 150 MG/1
300 CAPSULE ORAL AT BEDTIME
Status: DISCONTINUED | OUTPATIENT
Start: 2022-12-23 | End: 2022-12-27 | Stop reason: HOSPADM

## 2022-12-23 RX ORDER — BUPROPION HYDROCHLORIDE 150 MG/1
150 TABLET ORAL EVERY MORNING
Status: DISCONTINUED | OUTPATIENT
Start: 2022-12-23 | End: 2022-12-27 | Stop reason: HOSPADM

## 2022-12-23 RX ORDER — LAMOTRIGINE 100 MG/1
50 TABLET ORAL AT BEDTIME
Status: DISCONTINUED | OUTPATIENT
Start: 2022-12-23 | End: 2022-12-27 | Stop reason: HOSPADM

## 2022-12-23 RX ORDER — DEXTROAMPHETAMINE SACCHARATE, AMPHETAMINE ASPARTATE MONOHYDRATE, DEXTROAMPHETAMINE SULFATE AND AMPHETAMINE SULFATE 2.5; 2.5; 2.5; 2.5 MG/1; MG/1; MG/1; MG/1
20 CAPSULE, EXTENDED RELEASE ORAL EVERY MORNING
Status: DISCONTINUED | OUTPATIENT
Start: 2022-12-24 | End: 2022-12-27 | Stop reason: HOSPADM

## 2022-12-23 RX ORDER — OXCARBAZEPINE 150 MG/1
150 TABLET, FILM COATED ORAL AT BEDTIME
Status: DISCONTINUED | OUTPATIENT
Start: 2022-12-23 | End: 2022-12-27 | Stop reason: HOSPADM

## 2022-12-23 RX ORDER — HYDROMORPHONE HYDROCHLORIDE 2 MG/1
2 TABLET ORAL
Status: DISCONTINUED | OUTPATIENT
Start: 2022-12-23 | End: 2022-12-25

## 2022-12-23 RX ORDER — BUPROPION HYDROCHLORIDE 150 MG/1
300 TABLET ORAL EVERY MORNING
Status: DISCONTINUED | OUTPATIENT
Start: 2022-12-23 | End: 2022-12-27 | Stop reason: HOSPADM

## 2022-12-23 RX ADMIN — OXYCODONE HYDROCHLORIDE 10 MG: 5 TABLET ORAL at 06:02

## 2022-12-23 RX ADMIN — BENZOCAINE AND MENTHOL 1 LOZENGE: 15; 3.6 LOZENGE ORAL at 21:13

## 2022-12-23 RX ADMIN — ACETAMINOPHEN 975 MG: 325 TABLET ORAL at 10:13

## 2022-12-23 RX ADMIN — HYDROMORPHONE HYDROCHLORIDE 0.2 MG: 0.2 INJECTION, SOLUTION INTRAMUSCULAR; INTRAVENOUS; SUBCUTANEOUS at 21:06

## 2022-12-23 RX ADMIN — HYDROXYZINE HYDROCHLORIDE 25 MG: 25 TABLET, FILM COATED ORAL at 12:30

## 2022-12-23 RX ADMIN — SENNOSIDES AND DOCUSATE SODIUM 1 TABLET: 50; 8.6 TABLET ORAL at 08:01

## 2022-12-23 RX ADMIN — HYDROMORPHONE HYDROCHLORIDE 0.4 MG: 0.2 INJECTION, SOLUTION INTRAMUSCULAR; INTRAVENOUS; SUBCUTANEOUS at 02:52

## 2022-12-23 RX ADMIN — HYDROMORPHONE HYDROCHLORIDE 2 MG: 2 TABLET ORAL at 15:36

## 2022-12-23 RX ADMIN — OXYCODONE HYDROCHLORIDE 10 MG: 5 TABLET ORAL at 01:51

## 2022-12-23 RX ADMIN — LAMOTRIGINE 50 MG: 100 TABLET ORAL at 21:12

## 2022-12-23 RX ADMIN — HYDROMORPHONE HYDROCHLORIDE 2 MG: 2 TABLET ORAL at 22:08

## 2022-12-23 RX ADMIN — LITHIUM CARBONATE 300 MG: 150 CAPSULE ORAL at 21:12

## 2022-12-23 RX ADMIN — ACETAMINOPHEN 975 MG: 325 TABLET ORAL at 18:42

## 2022-12-23 RX ADMIN — SENNOSIDES AND DOCUSATE SODIUM 1 TABLET: 50; 8.6 TABLET ORAL at 21:12

## 2022-12-23 RX ADMIN — HYDROMORPHONE HYDROCHLORIDE 2 MG: 2 TABLET ORAL at 12:30

## 2022-12-23 RX ADMIN — METHOCARBAMOL 750 MG: 750 TABLET, FILM COATED ORAL at 18:42

## 2022-12-23 RX ADMIN — CEFAZOLIN 1 G: 1 INJECTION, POWDER, FOR SOLUTION INTRAMUSCULAR; INTRAVENOUS at 05:51

## 2022-12-23 RX ADMIN — THERA TABS 1 TABLET: TAB at 08:02

## 2022-12-23 RX ADMIN — OXYCODONE HYDROCHLORIDE 10 MG: 5 TABLET ORAL at 10:13

## 2022-12-23 RX ADMIN — HYDROMORPHONE HYDROCHLORIDE 2 MG: 2 TABLET ORAL at 18:42

## 2022-12-23 RX ADMIN — HYDROMORPHONE HYDROCHLORIDE 0.4 MG: 0.2 INJECTION, SOLUTION INTRAMUSCULAR; INTRAVENOUS; SUBCUTANEOUS at 07:53

## 2022-12-23 RX ADMIN — HYDROXYZINE HYDROCHLORIDE 25 MG: 25 TABLET, FILM COATED ORAL at 01:52

## 2022-12-23 RX ADMIN — ACETAMINOPHEN 975 MG: 325 TABLET ORAL at 01:52

## 2022-12-23 RX ADMIN — POLYETHYLENE GLYCOL 3350 17 G: 17 POWDER, FOR SOLUTION ORAL at 08:02

## 2022-12-23 RX ADMIN — OXCARBAZEPINE 150 MG: 150 TABLET, FILM COATED ORAL at 21:12

## 2022-12-23 ASSESSMENT — ACTIVITIES OF DAILY LIVING (ADL)
ADLS_ACUITY_SCORE: 21
ADLS_ACUITY_SCORE: 21
ADLS_ACUITY_SCORE: 18
ADLS_ACUITY_SCORE: 21
ADLS_ACUITY_SCORE: 18
ADLS_ACUITY_SCORE: 21

## 2022-12-23 NOTE — PROGRESS NOTES
Physical Therapy Discharge Summary    Reason for therapy discharge:    All goals and outcomes met, no further needs identified.    Progress towards therapy goal(s). See goals on Care Plan in [facility] electronic health record for goal details.  Goals met    Therapy recommendation(s):    Continue home exercise program.

## 2022-12-23 NOTE — PROGRESS NOTES
"[facility] Progress Note    POD #: 1  S/P: Procedure(s):  CERVICAL 5-6 ANTERIOR CERVICAL DECOMPRESSION AND FUSION    SUBJECTIVE:  Patient lying in bed. Pain well controlled. Extremity pain improved from prior to surgery. Quite significant neck pain. States oxycodone only providing relief for 20 minutes, has required continued IV dilaudid. Will discharge IV dilaudid and switch to PO.   States difficulty swallowing, trouble with oatmeal this morning. Mild-mod difficulty swallowing liquids so far. Will place speech consult to assess. If very swollen can consider IV decadron.  No new extremity pain, paresthesias, weakness. Pt denies CP, SOB, lightheadedness, dizziness. (-) N/V, (+) flatus.    OBJECTIVE:  /59 (BP Location: Right arm, Patient Position: Fowlers)   Pulse 88   Temp 97.3  F (36.3  C) (Oral)   Resp 18   Ht 1.575 m (5' 2\")   Wt 66.8 kg (147 lb 3.2 oz)   SpO2 98%   BMI 26.92 kg/m      Intake/Output Summary (Last 24 hours) at 12/23/2022 1114  Last data filed at 12/23/2022 0648  Gross per 24 hour   Intake 2963.33 ml   Output 1500 ml   Net 1463.33 ml       PHYSICAL EXAM:  Incision: Covered. Gauze dressing in place. Appears clean, dry, intact.   Awake, alert, and oriented. Conversational. Non-labored breathing.    UPPER EXTREMITIES  RUE 5/5 Biceps, 5/5 triceps, 5/5 wrist extension, 5/5 wrist flexion, 5/5 finger abduction, 5/5  strength. RUE C5-8 intact to light touch  LUE 5/5 Biceps, 5/5 triceps, 5/5 wrist extension, 5/5 wrist flexion, 5/5 finger abduction, 5/5  strength. LUE C5-8 intact to light touch    Bilateral calves non-tender, non-edematous, non erythematous, no warmth.    LABS:  No components found for: HEMOGLOBIN    ASSESSMENT:  S/P Procedure(s):  CERVICAL 5-6 ANTERIOR CERVICAL DECOMPRESSION AND FUSION    PLAN:  -IM following. Appreciate their recs.  -Continue with pain management. Will discontinue IV dilaudid and start PO dilaudid 1-2mg Q3H prn for pain.  -PT/OT: ambulate as " tolerated. WBAT.  -Diet per protocol.  -Avoid bending, lifting, & twisting x6 weeks.  -DVT prophylaxis with SCDs, marifer stockings and early ambulation.  -No NSAIDS (advil/ibuprofen, aleve/naproxen, celebrex) for 3 months post-op.  -No aspirin for 5 days post-op.  -Encourage IS.  -Soft cervical collar to be worn for 6 weeks post-op as often as possible. Can remove for hygiene and meals.  -Speech consult for difficulty swallowing    Potentially discharge tomorrow pending pain control and improved swallowing.    Michelle Garcia PA-C  [facility]   [phone]   December 23, 2022 at 11:16 AM

## 2022-12-23 NOTE — PLAN OF CARE
2300-0243:  VSS on RA. BP's a bit soft, baseline per patient. Walking and voiding. 3 bladder scans done for bladder management protocol. Pain is not controlled. Needing IV Dilaudid for controlling severe pain. Patient has expressed feeling anxious and has been tearful on and off. Left note for [facility] to look into pain team consult. Saline locked. Tolerating regular diet. CMS intact. Fingers tingling, baseline per patient. Alert oriented. Calls appropriately.      Problem: Spinal Surgery  Goal: Optimal Pain Control and Function  Outcome: Not Progressing  Intervention: Prevent or Manage Pain  Recent Flowsheet Documentation  Taken 12/23/2022 0602 by Raina Ramirez RN  Pain Management Interventions:    medication (see MAR)    cold applied  Taken 12/23/2022 0252 by Raina Ramirez RN  Pain Management Interventions: medication (see MAR)  Taken 12/23/2022 0151 by Raina Ramirez RN  Pain Management Interventions: medication (see MAR)  Taken 12/22/2022 2342 by Raina Ramirez RN  Pain Management Interventions: medication (see MAR)     Raina Ramirez RN

## 2022-12-23 NOTE — PLAN OF CARE
Problem: Spinal Surgery  Goal: Optimal Coping with Surgery  Outcome: Progressing     Problem: Spinal Surgery  Goal: Optimal Functional Ability  Outcome: Progressing     Problem: Spinal Surgery  Goal: Optimal Neurologic Function  Outcome: Progressing  Intervention: Optimize Neurologic Function  Recent Flowsheet Documentation  Taken 12/22/2022 1623 by Rosaura Juarez RN  Body Position: position changed independently     Problem: Spinal Surgery  Goal: Optimal Pain Control and Function  Outcome: Progressing     Problem: Spinal Surgery  Goal: Effective Urinary Elimination  Outcome: Progressing   Goal Outcome Evaluation:  Pt arrived to floor at 1623. A&Ox4. Vitals stable. Lungs clear, on room air. Neuros WNL. Dressing clean, dry and intact. Pt tearful and anxious upon arrival to floor, which continues to improve. Mildred Gomez at bedside. PRN dilaudid and prn oxycodone given for incisional pain with some relief. Pt up to the bathroom with A1, walker and belt. Was up in chair this evening. Voiding without difficulty. Tolerating food and liquids. Pt now resting in bed.

## 2022-12-23 NOTE — PLAN OF CARE
Patient vital signs are at baseline: Yes  Patient able to ambulate as they were prior to admission or with assist devices provided by therapies during their stay:  Yes  Patient MUST void prior to discharge:  Yes  Patient able to tolerate oral intake:  Yes  Pain has adequate pain control using Oral analgesics:  Yes, received one dose of IV dilaudid this morning, now tolerating pain with oral medication.  Does patient have an identified :  Yes  Has goal D/C date and time been discussed with patient:  Yes    Patient A&O, CMS intact and VSS. Patient has voided and tolerated oral intake. Patient has also been voiding adequately. Managing pain with PRN medications and non-pharmacological interventions. Speech was consulted, but did not see patient today.  Tami Partida RN

## 2022-12-23 NOTE — UTILIZATION REVIEW
Admission Status; Secondary Review Determination   CONDITIONAL REVIEW    Under the authority of the Utilization Management Committee, the utilization review process indicated a secondary review on the above patient. The review outcome is based on review of the medical records, discussions with staff, and applying clinical experience noted on the date of the review.     (x) Inpatient Status Appropriate - This patient's medical care is consistent with medical management for inpatient care and reasonable inpatient medical practice.     RATIONALE FOR DETERMINATION     Ms. Delgado is a 43 yo female pt who was admitted for elective cervical decompression and fusion.  Her post-op course has been complicated by significant post-op pain that has been difficult to control without IV narcotics.  She also is reporting difficulty swallowing today; diet is being modified.  Speech therapy consulted.  Considering IV decadron later today if it is felt that post-op swelling is interfering with her ability to swallow safely.    At the time of admission with the information available to the attending physician more than 2 nights Hospital complex care was anticipated, based on patient risk of adverse outcome if treated as outpatient and complex care required. Inpatient admission is appropriate based on the Medicare guidelines.     The information on this document is developed by the utilization review team in order for the business office to ensure compliance. This only denotes the appropriateness of proper admission status and does not reflect the quality of care rendered.   The definitions of Inpatient Status and Observation Status used in making the determination above are those provided in the CMS Coverage Manual, Chapter 1 and Chapter 6, section 70.4.         Sincerely,     Alethea Gonzalez, DO  Utilization Review  Physician Advisor  [facility].

## 2022-12-23 NOTE — PROGRESS NOTES
12/23/22 0842   Appointment Info   Signing Clinician's Name / Credentials (PT) Klaudia Platt, PT, DPT   Living Environment   People in Home significant other   Current Living Arrangements house   Home Accessibility stairs to enter home   Number of Stairs, Main Entrance 1   Self-Care   Equipment Currently Used at Home none   Fall history within last six months no   General Information   Onset of Illness/Injury or Date of Surgery 12/22/22   Referring Physician Dr. Tera Angeles   Patient/Family Therapy Goals Statement (PT) Move without pain   Pertinent History of Current Problem (include personal factors and/or comorbidities that impact the POC) S/P C5-6 Anterior Fusion   Existing Precautions/Restrictions   (Soft collar for comfort)   Weight-Bearing Status - LLE full weight-bearing   Weight-Bearing Status - RLE full weight-bearing   Transfers   Transfers sit-stand transfer   Maintains Weight-bearing Status (Transfers) able to maintain   Sit-Stand Transfer   Sit-Stand [place] (Transfers) supervision   Assistive Device (Sit-Stand Transfers)   (None)   Gait/Stairs (Locomotion)   [place] Level (Gait) contact guard;verbal cues   Assistive Device (Gait) walker, front-wheeled   Distance in Feet 5   Distance in Feet (Gait) 5, 150   Pattern (Gait) step-through   Deviations/Abnormal Patterns (Gait) geo decreased;festinating/shuffling   Maintains Weight-bearing Status (Gait) able to maintain   Clinical Impression   Criteria for Skilled Therapeutic Intervention Yes, treatment indicated   PT Diagnosis (PT) Impaired functional mobility   Influenced by the following impairments Weakness, pain   Functional limitations due to impairments transfers, gait, stairs   Clinical Presentation (PT Evaluation Complexity) Stable/Uncomplicated   Clinical Presentation Rationale Pt presents as medically diagnosed   Clinical Decision Making (Complexity) low complexity   Planned Therapy Interventions (PT) gait training;home  exercise program;patient/family education;transfer training;stair training   Anticipated Equipment Needs at Discharge (PT)   (None)   Risk & Benefits of therapy have been explained evaluation/treatment results reviewed;care plan/treatment goals reviewed;patient   PT Total Evaluation Time   PT Eval, Low Complexity Minutes (95701) 10   Physical Therapy Goals   PT Frequency One time eval and treatment only   PT Predicted Duration/Target Date for Goal Attainment 12/23/22   PT Goals Gait;PT Goal 1   PT: Gait Supervision/stand-by assist;150 feet;Goal Met   PT: Goal 1 Supervision with Cervical HEP (no brace indicated)  (Goal Met)   Interventions   Interventions Quick Adds Gait Training;Therapeutic Procedure;Therapeutic Activity   Therapeutic Procedure/Exercise   Ther. Procedure: strength, endurance, ROM, flexibillity Minutes (20692) 5   Treatment Detail/Skilled Intervention Cervical (no brace indicated) HEP x 5 reps each exercise, SBA, verbal cueing for exercise technique. reviewed lifting restriction   Therapeutic Activity   Symptoms Noted During/After Treatment Increased pain   Treatment Detail/Skilled Intervention Sit<>stand with no AD, min assist from SO. SO will be available for support at home for next several days. Toilet transfer, independent   Gait Training   Gait Training Minutes (72878) 8   Symptoms Noted During/After Treatment (Gait Training) increased pain;fatigue   Treatment Detail/Skilled Intervention verbal cueing for safety. 5ft with FWW, SBA, patient reports easier to ambulate without AD. 150ft with no AD, SBA. Patient reports confidence in being able to negotiate 1 stair into home with assist from SO. Education on post-op ambulation and having functional mobility be focus of rehab at this time. Education on soft collar for comfort   [place] Level (Gait Training) stand-by assist   Physical Assistance Level (Gait Training) verbal cues   Weight Bearing (Gait Training) full weight-bearing   Assistive  Device (Gait Training)   (no AD)   Pattern Analysis (Gait Training) swing-through gait   Gait Analysis Deviations decreased geo;decreased step length   Impairments (Gait Analysis/Training) pain;strength decreased   PT Discharge Planning   PT Discharge Recommendation (DC Rec) (S)  home with assist   PT Rationale for DC Rec Patient ambulating safely, SO for support at home   PT Brief overview of current status Amb 150ft with no AD, SBA   Total Session Time   Timed Code Treatment Minutes 13   Total Session Time (sum of timed and untimed services) 23

## 2022-12-23 NOTE — PROGRESS NOTES
OT: Orders received. Chart reviewed and discussed with care team.  OT not indicated due to pt being IND w/ ADLs and having support at home from SO.  Defer discharge recommendations to PT and rest of care team.  Will complete orders.

## 2022-12-23 NOTE — PROGRESS NOTES
Care Management Follow Up    Length of Stay (days): 1    Expected Discharge Date: 12/23/2022     Concerns to be Addressed:    Chart assessed; discharge planning   Patient plan of care discussed at interdisciplinary rounds:     Anticipated Discharge Disposition: Home     Anticipated Discharge Services:  none  Anticipated Discharge DME:  unknown    Additional Information:  Chart Assessed. Pt lives in [place]. She is independent at baseline. Has SO and dependent children in the home.No CM needs identified. Anticipate return home and family to transport.    JOSE Martel, [facility]    12/23/2022   12:24 PM

## 2022-12-24 ENCOUNTER — APPOINTMENT (OUTPATIENT)
Dept: SPEECH THERAPY | Facility: CLINIC | Age: 42
End: 2022-12-24
Payer: MEDICAID

## 2022-12-24 LAB — HGB BLD-MCNC: 10.8 G/DL (ref 11.7–15.7)

## 2022-12-24 PROCEDURE — 250N000013 HC RX MED GY IP 250 OP 250 PS 637: Performed by: ORTHOPAEDIC SURGERY

## 2022-12-24 PROCEDURE — 250N000013 HC RX MED GY IP 250 OP 250 PS 637: Performed by: STUDENT IN AN ORGANIZED HEALTH CARE EDUCATION/TRAINING PROGRAM

## 2022-12-24 PROCEDURE — 120N000001 HC R&B MED SURG/OB

## 2022-12-24 PROCEDURE — 92526 ORAL FUNCTION THERAPY: CPT | Mod: GN | Performed by: SPEECH-LANGUAGE PATHOLOGIST

## 2022-12-24 PROCEDURE — 36415 COLL VENOUS BLD VENIPUNCTURE: CPT

## 2022-12-24 PROCEDURE — 92610 EVALUATE SWALLOWING FUNCTION: CPT | Mod: GN | Performed by: SPEECH-LANGUAGE PATHOLOGIST

## 2022-12-24 PROCEDURE — 250N000013 HC RX MED GY IP 250 OP 250 PS 637

## 2022-12-24 PROCEDURE — 250N000011 HC RX IP 250 OP 636

## 2022-12-24 PROCEDURE — 250N000011 HC RX IP 250 OP 636: Performed by: STUDENT IN AN ORGANIZED HEALTH CARE EDUCATION/TRAINING PROGRAM

## 2022-12-24 PROCEDURE — 85018 HEMOGLOBIN: CPT

## 2022-12-24 RX ORDER — DEXAMETHASONE 4 MG/1
4 TABLET ORAL EVERY 6 HOURS SCHEDULED
Status: COMPLETED | OUTPATIENT
Start: 2022-12-24 | End: 2022-12-25

## 2022-12-24 RX ORDER — DIAZEPAM 5 MG
5 TABLET ORAL ONCE
Status: COMPLETED | OUTPATIENT
Start: 2022-12-24 | End: 2022-12-24

## 2022-12-24 RX ADMIN — OXCARBAZEPINE 150 MG: 150 TABLET, FILM COATED ORAL at 20:55

## 2022-12-24 RX ADMIN — METHOCARBAMOL 750 MG: 750 TABLET, FILM COATED ORAL at 10:07

## 2022-12-24 RX ADMIN — HYDROMORPHONE HYDROCHLORIDE 2 MG: 2 TABLET ORAL at 11:11

## 2022-12-24 RX ADMIN — DIAZEPAM 5 MG: 5 TABLET ORAL at 14:08

## 2022-12-24 RX ADMIN — HYDROXYZINE HYDROCHLORIDE 25 MG: 25 TABLET, FILM COATED ORAL at 16:59

## 2022-12-24 RX ADMIN — HYDROXYZINE HYDROCHLORIDE 25 MG: 25 TABLET, FILM COATED ORAL at 23:31

## 2022-12-24 RX ADMIN — DEXTROAMPHETAMINE SACCHARATE, AMPHETAMINE ASPARTATE MONOHYDRATE, DEXTROAMPHETAMINE SULFATE, AND AMPHETAMINE SULFATE 20 MG: 2.5; 2.5; 2.5; 2.5 CAPSULE, EXTENDED RELEASE ORAL at 08:10

## 2022-12-24 RX ADMIN — BUPROPION 300 MG: 150 TABLET, EXTENDED RELEASE ORAL at 08:10

## 2022-12-24 RX ADMIN — HYDROMORPHONE HYDROCHLORIDE 2 MG: 2 TABLET ORAL at 04:58

## 2022-12-24 RX ADMIN — LAMOTRIGINE 50 MG: 100 TABLET ORAL at 20:55

## 2022-12-24 RX ADMIN — HYDROXYZINE HYDROCHLORIDE 25 MG: 25 TABLET, FILM COATED ORAL at 08:07

## 2022-12-24 RX ADMIN — DEXAMETHASONE 4 MG: 4 TABLET ORAL at 17:00

## 2022-12-24 RX ADMIN — METHOCARBAMOL 750 MG: 750 TABLET, FILM COATED ORAL at 19:17

## 2022-12-24 RX ADMIN — POLYETHYLENE GLYCOL 3350 17 G: 17 POWDER, FOR SOLUTION ORAL at 08:11

## 2022-12-24 RX ADMIN — ONDANSETRON 4 MG: 2 INJECTION INTRAMUSCULAR; INTRAVENOUS at 06:24

## 2022-12-24 RX ADMIN — HYDROMORPHONE HYDROCHLORIDE 2 MG: 2 TABLET ORAL at 08:07

## 2022-12-24 RX ADMIN — THERA TABS 1 TABLET: TAB at 08:11

## 2022-12-24 RX ADMIN — SENNOSIDES AND DOCUSATE SODIUM 1 TABLET: 50; 8.6 TABLET ORAL at 08:11

## 2022-12-24 RX ADMIN — HYDROMORPHONE HYDROCHLORIDE 0.2 MG: 0.2 INJECTION, SOLUTION INTRAMUSCULAR; INTRAVENOUS; SUBCUTANEOUS at 16:58

## 2022-12-24 RX ADMIN — HYDROMORPHONE HYDROCHLORIDE 2 MG: 2 TABLET ORAL at 14:08

## 2022-12-24 RX ADMIN — ACETAMINOPHEN 975 MG: 325 TABLET ORAL at 10:02

## 2022-12-24 RX ADMIN — ACETAMINOPHEN 975 MG: 325 TABLET ORAL at 01:09

## 2022-12-24 RX ADMIN — BUPROPION 150 MG: 150 TABLET, EXTENDED RELEASE ORAL at 08:10

## 2022-12-24 RX ADMIN — HYDROMORPHONE HYDROCHLORIDE 2 MG: 2 TABLET ORAL at 23:32

## 2022-12-24 RX ADMIN — ACETAMINOPHEN 975 MG: 325 TABLET ORAL at 17:00

## 2022-12-24 RX ADMIN — LITHIUM CARBONATE 300 MG: 150 CAPSULE ORAL at 20:55

## 2022-12-24 RX ADMIN — SENNOSIDES AND DOCUSATE SODIUM 1 TABLET: 50; 8.6 TABLET ORAL at 20:55

## 2022-12-24 RX ADMIN — DEXAMETHASONE 4 MG: 4 TABLET ORAL at 11:11

## 2022-12-24 RX ADMIN — HYDROMORPHONE HYDROCHLORIDE 2 MG: 2 TABLET ORAL at 01:09

## 2022-12-24 RX ADMIN — HYDROMORPHONE HYDROCHLORIDE 2 MG: 2 TABLET ORAL at 19:17

## 2022-12-24 ASSESSMENT — ACTIVITIES OF DAILY LIVING (ADL)
ADLS_ACUITY_SCORE: 21

## 2022-12-24 NOTE — PROGRESS NOTES
"Speech pathology: Clinical swallow evaluation   12/24/22 6326   Appointment Info   Signing Clinician's Name / Credentials (SLP) Dino Murillo MA, CCC-SLP   General Information   Onset of Illness/Injury or Date of Surgery 12/22/22   Referring Physician YUE Garcia   Patient/Family Therapy Goal Statement (SLP) Swallow without pain   Pertinent History of Current Problem Status post: CERVICAL 5-6 ANTERIOR CERVICAL DECOMPRESSION AND FUSION. Per provider note, dated yesterday: \"Patient lying in bed. Pain well controlled. Extremity pain improved from prior to surgery. Quite significant neck pain. States oxycodone only providing relief for 20 minutes, has required continued IV dilaudid. Will discharge IV dilaudid and switch to PO.   States difficulty swallowing, trouble with oatmeal this morning. Mild-mod difficulty swallowing liquids so far. Will place speech consult to assess. If very swollen can consider IV decadron.\"  Note per MD, dated today \"Swallowing has improved compared to yesterday.\"   General Observations Patient's  present.  Patient cooperative and pleasant   Type of Evaluation   Type of Evaluation Swallow Evaluation   Oral Motor   Oral Musculature generally intact   Structural Abnormalities none present   Mucosal Quality adequate   Dentition (Oral Motor)   Dentition (Oral Motor) natural dentition   Facial Symmetry (Oral Motor)   Facial Symmetry (Oral Motor) WNL   Lip Function (Oral Motor)   Lip Range of Motion (Oral Motor) WNL   Lip Strength (Oral Motor) WNL   Tongue Function (Oral Motor)   Tongue Strength (Oral Motor) WNL   Tongue Coordination/Speed (Oral Motor) WNL   Tongue ROM (Oral Motor) WNL   Cough/Swallow/Gag Reflex (Oral Motor)   Volitional Swallow (Oral Motor) effortful   Vocal Quality/Secretion Management (Oral Motor)   Vocal Quality (Oral Motor) WFL;hoarse   Secretion Management (Oral Motor) WNL   Comment, Vocal Quality/Secretion Management (Oral Motor) Voice was mildly hoarse " initially.  Cleared with sips of water, suspect hoarseness related to xerostomia.   General Swallowing Observations   Respiratory Support (General Swallowing Observations) none   Current Diet/Method of Nutritional Intake (General Swallowing Observations, NIS) thin liquids (level 0);regular diet   Swallowing Evaluation Clinical swallow evaluation   Clinical Swallow Evaluation   Feeding Assistance no assistance needed   Clinical Swallow Evaluation Textures Trialed thin liquids;pureed;solid foods   Clinical Swallow Eval: Thin Liquid Texture Trial   Mode of Presentation, Thin Liquids cup;straw   Volume of Liquid or Food Presented 2 ounces   Oral Phase of Swallow WFL   Pharyngeal Phase of Swallow throat clearing  (Initial, inconsistent)   Diagnostic Statement Subtle signs and symptoms of aspiration noted initially, but resolved with subsequent trials.  No signs and symptoms of aspiration with thin liquids when used as a liquid wash with solid textures.   Clinical Swallow Evaluation: Puree Solid Texture Trial   Mode of Presentation, Puree spoon   Volume of Puree Presented 3 ounces   Oral Phase, Puree WFL   Pharyngeal Phase, Puree feeling of something stuck in throat;throat clearing   Successful Strategies Trialed During Procedure, Puree other (see comments)  (Double swallow)   Diagnostic Statement Subtle signs and symptoms of aspiration and/or pharyngeal dysphagia with globus sensation and multiple swallows.  Suspect this is related to stasis versus aspiration or penetration.   Clinical Swallow Evaluation: Solid Food Texture Trial   Mode of Presentation self-fed   Volume Presented Misael cracker   Oral Phase WFL   Pharyngeal Phase feeling of something stuck in throat;repeated swallows;throat clearing   Successful Strategies Trialed During Procedure other (see comments)  (Double swallow)   Diagnostic Statement Subtle signs and symptoms of aspiration and/or pharyngeal dysphagia with globus sensation and multiple swallows.   Suspect this is related to stasis versus aspiration or penetration.   Esophageal Phase of Swallow   Patient reports or presents with symptoms of esophageal dysphagia No   Swallowing Recommendations   Diet Consistency Recommendations thin liquids (level 0);regular diet   Supervision Level for Intake patient independent   Mode of Delivery Recommendations bolus size, small;slow rate of intake;food moistened   Swallowing Maneuver Recommendations double dry swallow;alternate food and liquid intake   Monitoring/Assistance Required (Eating/Swallowing) optimize oral intake to minimize need for tube feeding   Recommended Feeding/Eating Techniques (Swallow Eval) maintain upright sitting position for eating;maintain upright posture during/after eating for 30 minutes   Medication Administration Recommendations, Swallowing (SLP) Per preference   Instrumental Assessment Recommendations instrumental evaluation not recommended at this time   Comment, Swallowing Recommendations Patient presents with inconsistent signs and symptoms of oropharyngeal dysphagia with multiple consistencies.  Suspect this is primarily related to pharyngeal residue given reports of globus sensation.  Recommend continue diet of regular textures and thin liquids with above strategies.   General Therapy Interventions   Planned Therapy Interventions Dysphagia Treatment   Dysphagia treatment Instruction of safe swallow strategies;Compensatory strategies for swallowing   Clinical Impression   Criteria for Skilled Therapeutic Interventions Met (SLP Eval) Yes, treatment indicated   SLP Diagnosis Dysphagia   Risks & Benefits of therapy have been explained evaluation/treatment results reviewed;care plan/treatment goals reviewed;participants voiced agreement with care plan;participants included;patient;spouse/significant other   Clinical Impression Comments Presents with presumed mild pharyngeal dysphagia characterized by adequate oral phase, decreased pharyngeal  clearance leading to globus sensation and associated throat clearing.  Pain with swallowing is a significant barrier to efficient swallow and adequate intake.  Appears appropriate to continue current diet of regular textures and thin liquids with strategies of: Fully upright for intake, small bites and sips, alternate bites and sips, moisten food with sips of water, double swallow.   SLP Total Evaluation Time   Eval: oral/pharyngeal swallow function, clinical swallow Minutes (62505) 25   SLP Goals   Therapy Frequency (SLP Eval) 3 times/wk   SLP Predicted Duration/Target Date for Goal Attainment 12/28/22   SLP Discharge Planning   SLP Plan Follow-up regarding diet tolerance and effectiveness and implementation of swallow strategies.  If no concerns, appropriate to DC from speech therapy   SLP Discharge Recommendation home   SLP Rationale for DC Rec No ongoing speech therapy services anticipated at DC   SLP Brief overview of current status  Appropriate for regular textures and thin liquids with above strategies.

## 2022-12-24 NOTE — PLAN OF CARE
Problem: Pain Acute  Goal: Optimal Pain Control and Function  Outcome: Progressing  Intervention: Develop Pain Management Plan  Intervention: Prevent or Manage Pain    Patient vital signs are at baseline: Yes; BP 80s/50s, asymptomatic. Left message with on call PA for [place].  Patient able to ambulate as they were prior to admission or with assist devices provided by therapies during their stay:  Yes  Patient MUST void prior to discharge:  Yes  Patient able to tolerate oral intake:  Yes  Pain has adequate pain control using Oral analgesics:  No, Reason: Required IV Dilaudid for break through pain x1.  Does patient have an identified :  Yes  Has goal D/C date and time been discussed with patient:  Yes     CMS intact. Dressing CDI. Up independently in room. Anticipated discharge home pending pain management.

## 2022-12-24 NOTE — PROGRESS NOTES
"[facility] Progress Note    POD #: 2  S/P: Procedure(s):  CERVICAL 5-6 ANTERIOR CERVICAL DECOMPRESSION AND FUSION    SUBJECTIVE:  No acute events overnight. She is resting comfortable sitting up in bed. She reports pain has somewhat improved compared to yesterday, but endorses continued discomfort at the neck with some radiation into the upper shoulders. Reports she feels her radiating extremity seems to have improved compared to pre operatively. Notes that oral pain meds are providing some relief, but not lasting between doses. Swallowing has improved compared to yesterday.   No reports of CP, SOB, lightheadedness, dizziness. Has had nausea today, improved with medication.     OBJECTIVE:  BP 99/57 (BP Location: Left arm)   Pulse 79   Temp 97.1  F (36.2  C) (Oral)   Resp 16   Ht 1.575 m (5' 2\")   Wt 66.8 kg (147 lb 3.2 oz)   SpO2 97%   BMI 26.92 kg/m      Intake/Output Summary (Last 24 hours) at 12/23/2022 1114  Last data filed at 12/23/2022 0648  Gross per 24 hour   Intake 2963.33 ml   Output 1500 ml   Net 1463.33 ml       PHYSICAL EXAM:  Incision: Covered. Gauze dressing in place, c/d/i without strike through.   Awake, alert, and oriented. Pleasant. Non-labored breathing.    UPPER EXTREMITIES  RUE 5/5 Biceps, 5/5 triceps, 5/5 wrist extension, 5/5 wrist flexion, 5/5 finger abduction, 5/5  strength. RUE C5-8 intact to light touch  LUE 5/5 Biceps, 5/5 triceps, 5/5 wrist extension, 5/5 wrist flexion, 5/5 finger abduction, 5/5  strength. LUE C5-8 intact to light touch  Strong bilateral radial pulses. Hands warm and well perfused.     ASSESSMENT:  42 year old female POD#2 s/p ACDF C5-6 with Dr. Angeles, improving but still with pain limiting discharge to home. Swallowing improved from yesterday.     PLAN:  -IM following. Appreciate their recs.  -Continue with pain management. Continue with PO dilaudid 1-2mg Q3H prn for pain. In discussion with Dr. Angeles, will initiate decadron 4 mg q6hr x " 4 doses in addition to single dose of oral valium this AM. If pain is improved this morning to allow for discharge to home, will continue with medrol dosepak outpatient.   -PT/OT: ambulate as tolerated. WBAT.  -Diet per protocol.  -Avoid bending, lifting, & twisting x6 weeks.  -DVT prophylaxis with SCDs, marifer stockings and early ambulation.  -No NSAIDS (advil/ibuprofen, aleve/naproxen, celebrex) for 3 months post-op.  -No aspirin for 5 days post-op.  -Encourage IS.  -Soft cervical collar to be worn for 6 weeks post-op as often as possible. Can remove for hygiene and meals.    Dispo: Potentially discharge today pending pain and nausea improvement, otherwise likely tomorrow AM.       Feliz Chiang MD   Orthopedic Surgery   [facility]

## 2022-12-24 NOTE — PLAN OF CARE
"  Problem: Spinal Surgery  Goal: Optimal Pain Control and Function  Outcome: Progressing   Goal Outcome Evaluation:       Pain stays always 6-8/10. Trying to alternate prn's to maintain best steady coverage.. Swallowing is still a challenge, she states \"it feels like razor blades in my throat.\" Nonetheless, she does feel she is swallowing more easily than yesterday. She had Mac and cheese for lunch.                  "

## 2022-12-25 PROCEDURE — 250N000013 HC RX MED GY IP 250 OP 250 PS 637

## 2022-12-25 PROCEDURE — 250N000011 HC RX IP 250 OP 636: Performed by: ORTHOPAEDIC SURGERY

## 2022-12-25 PROCEDURE — 250N000013 HC RX MED GY IP 250 OP 250 PS 637: Performed by: ORTHOPAEDIC SURGERY

## 2022-12-25 PROCEDURE — 120N000001 HC R&B MED SURG/OB

## 2022-12-25 PROCEDURE — 250N000011 HC RX IP 250 OP 636

## 2022-12-25 PROCEDURE — 250N000011 HC RX IP 250 OP 636: Performed by: STUDENT IN AN ORGANIZED HEALTH CARE EDUCATION/TRAINING PROGRAM

## 2022-12-25 RX ORDER — HYDROMORPHONE HYDROCHLORIDE 2 MG/1
1-2 TABLET ORAL
Qty: 24 TABLET | Refills: 0 | Status: SHIPPED | OUTPATIENT
Start: 2022-12-25 | End: 2022-12-26

## 2022-12-25 RX ORDER — ACETAMINOPHEN 325 MG/1
975 TABLET ORAL EVERY 8 HOURS
Qty: 100 TABLET | Refills: 0 | Status: SHIPPED | OUTPATIENT
Start: 2022-12-25 | End: 2022-12-26

## 2022-12-25 RX ORDER — METHYLPREDNISOLONE 4 MG/1
4 TABLET ORAL
Status: DISCONTINUED | OUTPATIENT
Start: 2022-12-26 | End: 2022-12-25

## 2022-12-25 RX ORDER — ACETAMINOPHEN 325 MG/1
975 TABLET ORAL 3 TIMES DAILY
Status: DISCONTINUED | OUTPATIENT
Start: 2022-12-25 | End: 2022-12-27 | Stop reason: HOSPADM

## 2022-12-25 RX ORDER — METHYLPREDNISOLONE 4 MG/1
4 TABLET ORAL AT BEDTIME
Status: DISCONTINUED | OUTPATIENT
Start: 2022-12-29 | End: 2022-12-27 | Stop reason: HOSPADM

## 2022-12-25 RX ORDER — HYDROMORPHONE HYDROCHLORIDE 4 MG/1
4 TABLET ORAL
Status: DISCONTINUED | OUTPATIENT
Start: 2022-12-25 | End: 2022-12-27 | Stop reason: HOSPADM

## 2022-12-25 RX ORDER — METHOCARBAMOL 750 MG/1
750 TABLET, FILM COATED ORAL EVERY 6 HOURS PRN
Qty: 30 TABLET | Refills: 0 | Status: SHIPPED | OUTPATIENT
Start: 2022-12-25 | End: 2022-12-26

## 2022-12-25 RX ORDER — METHYLPREDNISOLONE 4 MG/1
4 TABLET ORAL
Status: DISCONTINUED | OUTPATIENT
Start: 2022-12-27 | End: 2022-12-27 | Stop reason: HOSPADM

## 2022-12-25 RX ORDER — METHYLPREDNISOLONE 4 MG/1
4 TABLET ORAL ONCE
Status: DISCONTINUED | OUTPATIENT
Start: 2022-12-25 | End: 2022-12-25

## 2022-12-25 RX ORDER — AMOXICILLIN 250 MG
1 CAPSULE ORAL 2 TIMES DAILY
Qty: 30 TABLET | Refills: 0 | Status: SHIPPED | OUTPATIENT
Start: 2022-12-25 | End: 2022-12-26

## 2022-12-25 RX ORDER — METHYLPREDNISOLONE 4 MG/1
8 TABLET ORAL ONCE
Status: COMPLETED | OUTPATIENT
Start: 2022-12-26 | End: 2022-12-26

## 2022-12-25 RX ORDER — METHYLPREDNISOLONE 4 MG
TABLET, DOSE PACK ORAL
Qty: 21 TABLET | Refills: 0 | Status: SHIPPED | OUTPATIENT
Start: 2022-12-25 | End: 2022-12-26

## 2022-12-25 RX ORDER — METHYLPREDNISOLONE 4 MG/1
4 TABLET ORAL AT BEDTIME
Status: DISCONTINUED | OUTPATIENT
Start: 2022-12-27 | End: 2022-12-25

## 2022-12-25 RX ORDER — HYDROXYZINE HYDROCHLORIDE 25 MG/1
25 TABLET, FILM COATED ORAL EVERY 6 HOURS PRN
Qty: 30 TABLET | Refills: 0 | Status: SHIPPED | OUTPATIENT
Start: 2022-12-25 | End: 2022-12-26

## 2022-12-25 RX ORDER — METHYLPREDNISOLONE 4 MG/1
8 TABLET ORAL AT BEDTIME
Status: DISCONTINUED | OUTPATIENT
Start: 2022-12-26 | End: 2022-12-27 | Stop reason: HOSPADM

## 2022-12-25 RX ORDER — METHYLPREDNISOLONE 4 MG/1
8 TABLET ORAL
Status: DISCONTINUED | OUTPATIENT
Start: 2022-12-26 | End: 2022-12-25

## 2022-12-25 RX ORDER — DEXAMETHASONE 4 MG/1
4 TABLET ORAL ONCE
Status: COMPLETED | OUTPATIENT
Start: 2022-12-25 | End: 2022-12-25

## 2022-12-25 RX ORDER — METHYLPREDNISOLONE 4 MG/1
8 TABLET ORAL AT BEDTIME
Status: DISCONTINUED | OUTPATIENT
Start: 2022-12-25 | End: 2022-12-25

## 2022-12-25 RX ORDER — HYDROMORPHONE HYDROCHLORIDE 2 MG/1
2 TABLET ORAL
Status: DISCONTINUED | OUTPATIENT
Start: 2022-12-25 | End: 2022-12-27 | Stop reason: HOSPADM

## 2022-12-25 RX ORDER — ONDANSETRON 4 MG/1
4 TABLET, ORALLY DISINTEGRATING ORAL EVERY 6 HOURS PRN
Qty: 10 TABLET | Refills: 0 | Status: SHIPPED | OUTPATIENT
Start: 2022-12-25 | End: 2022-12-26

## 2022-12-25 RX ORDER — METHYLPREDNISOLONE 4 MG/1
4 TABLET ORAL ONCE
Status: COMPLETED | OUTPATIENT
Start: 2022-12-26 | End: 2022-12-26

## 2022-12-25 RX ADMIN — ACETAMINOPHEN 975 MG: 325 TABLET ORAL at 09:33

## 2022-12-25 RX ADMIN — HYDROXYZINE HYDROCHLORIDE 25 MG: 25 TABLET, FILM COATED ORAL at 21:55

## 2022-12-25 RX ADMIN — HYDROMORPHONE HYDROCHLORIDE 2 MG: 2 TABLET ORAL at 06:19

## 2022-12-25 RX ADMIN — HYDROXYZINE HYDROCHLORIDE 25 MG: 25 TABLET, FILM COATED ORAL at 09:33

## 2022-12-25 RX ADMIN — DEXAMETHASONE 4 MG: 4 TABLET ORAL at 00:33

## 2022-12-25 RX ADMIN — DEXAMETHASONE 4 MG: 4 TABLET ORAL at 18:47

## 2022-12-25 RX ADMIN — METHOCARBAMOL 750 MG: 750 TABLET, FILM COATED ORAL at 18:47

## 2022-12-25 RX ADMIN — HYDROMORPHONE HYDROCHLORIDE 2 MG: 2 TABLET ORAL at 15:52

## 2022-12-25 RX ADMIN — DEXTROAMPHETAMINE SACCHARATE, AMPHETAMINE ASPARTATE MONOHYDRATE, DEXTROAMPHETAMINE SULFATE, AND AMPHETAMINE SULFATE 20 MG: 2.5; 2.5; 2.5; 2.5 CAPSULE, EXTENDED RELEASE ORAL at 06:58

## 2022-12-25 RX ADMIN — BUPROPION 300 MG: 150 TABLET, EXTENDED RELEASE ORAL at 06:58

## 2022-12-25 RX ADMIN — ONDANSETRON 4 MG: 2 INJECTION INTRAMUSCULAR; INTRAVENOUS at 10:34

## 2022-12-25 RX ADMIN — OXCARBAZEPINE 150 MG: 150 TABLET, FILM COATED ORAL at 21:54

## 2022-12-25 RX ADMIN — POLYETHYLENE GLYCOL 3350 17 G: 17 POWDER, FOR SOLUTION ORAL at 09:33

## 2022-12-25 RX ADMIN — BUPROPION 150 MG: 150 TABLET, EXTENDED RELEASE ORAL at 06:58

## 2022-12-25 RX ADMIN — HYDROMORPHONE HYDROCHLORIDE 0.2 MG: 0.2 INJECTION, SOLUTION INTRAMUSCULAR; INTRAVENOUS; SUBCUTANEOUS at 00:52

## 2022-12-25 RX ADMIN — HYDROMORPHONE HYDROCHLORIDE 2 MG: 2 TABLET ORAL at 18:46

## 2022-12-25 RX ADMIN — HYDROMORPHONE HYDROCHLORIDE 0.2 MG: 0.2 INJECTION, SOLUTION INTRAMUSCULAR; INTRAVENOUS; SUBCUTANEOUS at 05:22

## 2022-12-25 RX ADMIN — HYDROXYZINE HYDROCHLORIDE 25 MG: 25 TABLET, FILM COATED ORAL at 15:52

## 2022-12-25 RX ADMIN — HYDROMORPHONE HYDROCHLORIDE 4 MG: 4 TABLET ORAL at 21:54

## 2022-12-25 RX ADMIN — THERA TABS 1 TABLET: TAB at 09:33

## 2022-12-25 RX ADMIN — HYDROMORPHONE HYDROCHLORIDE 2 MG: 2 TABLET ORAL at 02:36

## 2022-12-25 RX ADMIN — ACETAMINOPHEN 975 MG: 325 TABLET ORAL at 21:55

## 2022-12-25 RX ADMIN — HYDROMORPHONE HYDROCHLORIDE 2 MG: 2 TABLET ORAL at 09:33

## 2022-12-25 RX ADMIN — SENNOSIDES AND DOCUSATE SODIUM 1 TABLET: 50; 8.6 TABLET ORAL at 09:33

## 2022-12-25 RX ADMIN — HYDROMORPHONE HYDROCHLORIDE 2 MG: 2 TABLET ORAL at 12:21

## 2022-12-25 RX ADMIN — LITHIUM CARBONATE 300 MG: 150 CAPSULE ORAL at 21:55

## 2022-12-25 RX ADMIN — SENNOSIDES AND DOCUSATE SODIUM 1 TABLET: 50; 8.6 TABLET ORAL at 21:55

## 2022-12-25 RX ADMIN — LAMOTRIGINE 50 MG: 100 TABLET ORAL at 21:55

## 2022-12-25 RX ADMIN — DEXAMETHASONE 4 MG: 4 TABLET ORAL at 06:19

## 2022-12-25 RX ADMIN — ACETAMINOPHEN 975 MG: 325 TABLET ORAL at 02:36

## 2022-12-25 ASSESSMENT — ACTIVITIES OF DAILY LIVING (ADL)
ADLS_ACUITY_SCORE: 21
ADLS_ACUITY_SCORE: 21
ADLS_ACUITY_SCORE: 23
ADLS_ACUITY_SCORE: 25
ADLS_ACUITY_SCORE: 23
ADLS_ACUITY_SCORE: 25
ADLS_ACUITY_SCORE: 23
ADLS_ACUITY_SCORE: 21
ADLS_ACUITY_SCORE: 21
ADLS_ACUITY_SCORE: 23
ADLS_ACUITY_SCORE: 21
ADLS_ACUITY_SCORE: 21

## 2022-12-25 NOTE — PROGRESS NOTES
No needs anticipated from CM at discharge per pt; independent at baseline. Pt to follow up with PCP post discharge if needs arise. Family to transport home.  11:29 AM    SAUL WooW  12/25/2022

## 2022-12-25 NOTE — PLAN OF CARE
Problem: Plan of Care - These are the overarching goals to be used throughout the patient stay.    Goal: Optimal Comfort and Wellbeing  Intervention: Monitor Pain and Promote Comfort  Recent Flowsheet Documentation  Taken 12/25/2022 0236 by Jacquie Lawson RN  Pain Management Interventions: medication (see MAR)  Taken 12/25/2022 0052 by Jacquie Lawson RN  Pain Management Interventions: medication (see MAR)  Taken 12/25/2022 0017 by Jacquie Lawson, RN  Pain Management Interventions:   care clustered   distraction   emotional support   medication (see MAR)   Goal Outcome Evaluation:    Patient vital signs are at baseline: Yes  Patient able to ambulate as they were prior to admission or with assist devices provided by therapies during their stay:  Yes  Patient MUST void prior to discharge:  Yes  Patient able to tolerate oral intake:  Yes  Pain has adequate pain control using Oral analgesics:  Partially met, iv dilaudid x1 overnight  Does patient have an identified :  Yes  Has goal D/C date and time been discussed with patient:  Yes    Pt A&Ox4. VSS on RA. CMS intact except baseline tingling to BUE. Dressing c/d/i. Up sba in room. Pain managed with PRN dilaudid 2 mg PRN q3h and IV dilaudid x1 for breakthrough pain. Possible discharge home today.

## 2022-12-25 NOTE — DISCHARGE INSTRUCTIONS
Tera Angeles MD    Attending    Since 12/22/2022    [phone]   To be seen in 2 weeks    Ervin Little    General - Family Medicine    [phone]   It is recommended to follow up with your primary care provider in 7 days

## 2022-12-25 NOTE — PROGRESS NOTES
"Orthopedic Progress Note      Assessment: 3 Days Post-Op  S/P Procedure(s):  CERVICAL 5-6 ANTERIOR CERVICAL DECOMPRESSION AND FUSION     Plan:   -IM following. Appreciate their recs.  -Continue with pain management. Continue with PO dilaudid 1-2mg Q3H prn for pain. In discussion with Dr. Angeles, will initiate decadron 4 mg q6hr x 4 doses in addition to single dose of oral valium this AM.  -PT/OT: ambulate as tolerated. WBAT.  -Diet per protocol.  -Avoid bending, lifting, & twisting x6 weeks.  -DVT prophylaxis with SCDs, marifer stockings and early ambulation.  -No NSAIDS (advil/ibuprofen, aleve/naproxen, celebrex) for 3 months post-op.  -No aspirin for 5 days post-op.  -Encourage IS.  -Soft cervical collar to be worn for 6 weeks post-op as often as possible. Can remove for hygiene and meals.     Dispo: Potentially discharge today pending pain improvement off of IV medication, otherwise likely tomorrow AM.       Subjective:  Pain: moderate  Nausea, Vomiting:  No  Lightheadedness, Dizziness:  No  Neuro:  Patient denies new onset numbness or paresthesias    Patient reports that she is doing okay today. States that pain may be improving slowly over past few days. Difficulty swallowing seems mostly resolved. Voices interest in discharging today if she is able to tolerate being off of IV medication. No new numbness/tingling/weakness.    Objective:  BP 93/51 (BP Location: Left arm)   Pulse 82   Temp 97.9  F (36.6  C) (Oral)   Resp 16   Ht 1.575 m (5' 2\")   Wt 66.8 kg (147 lb 3.2 oz)   SpO2 96%   BMI 26.92 kg/m    Incision: Covered. Gauze dressing in place, c/d/i without strike through.   Awake, alert, and oriented. Pleasant. Non-labored breathing.     UPPER EXTREMITIES  RUE 5/5 Biceps, 5/5 triceps, 5/5 wrist extension, 5/5 wrist flexion, 5/5 finger abduction, 5/5  strength. RUE C5-8 intact to light touch  LUE 5/5 Biceps, 5/5 triceps, 5/5 wrist extension, 5/5 wrist flexion, 5/5 finger abduction, 5/5  strength. " LUE C5-8 intact to light touch  Strong bilateral radial pulses. Hands warm and well perfused.     Pertinent Labs   Lab Results: personally reviewed.   No results found for: INR, PROTIME  Lab Results   Component Value Date    HGB 10.8 (L) 12/24/2022     No results found for: NA, CO2      Report completed by:  Kyle Orellana PA-C, YUE  Date: 12/25/2022  Time: 9:21 AM

## 2022-12-25 NOTE — PLAN OF CARE
Patient vital signs are at baseline: Yes  Patient able to ambulate as they were prior to admission or with assist devices provided by therapies during their stay:  Yes  Patient MUST void prior to discharge:  Yes  Patient able to tolerate oral intake:  Yes  Pain has adequate pain control using Oral analgesics:  Yes  Does patient have an identified :  Yes  Has goal D/C date and time been discussed with patient:  Yes Goal Outcome Evaluation:      Plan of Care Reviewed With: patient    Overall Patient Progress: improvingOverall Patient Progress: improving

## 2022-12-25 NOTE — PROGRESS NOTES
[facility] Progress Note    Pt continues to have ongoing uncontrolled neck pain. No radicular pain into either UE. Recently completely IV decadron with minimal relief. Per Dr Angeles she should complete medrol dosepak as well.     New onset hiccups. No PMHx pertinent to this symptom. Will continue to monitor. No concern for anything emergent at this time. Would recommend following up with primary care provider following discharge if continues.    PLAN  -Medrol dosepak starting 12/25  -Dilaudid increased to 2mg for moderate pain, and 4mg for severe pain.  -Restarted tylenol 975mg TID scheduled  -Continue hydroxyzine TID, methocarbamol  -Hoping to get pain under control for discharge.     Michelle Garica PA-C  [facility]   [phone]   December 25, 2022 at 5:02 PM

## 2022-12-26 ENCOUNTER — APPOINTMENT (OUTPATIENT)
Dept: SPEECH THERAPY | Facility: CLINIC | Age: 42
End: 2022-12-26
Attending: ORTHOPAEDIC SURGERY
Payer: MEDICAID

## 2022-12-26 PROCEDURE — 120N000001 HC R&B MED SURG/OB

## 2022-12-26 PROCEDURE — 92526 ORAL FUNCTION THERAPY: CPT | Mod: GN

## 2022-12-26 PROCEDURE — 250N000012 HC RX MED GY IP 250 OP 636 PS 637: Performed by: ORTHOPAEDIC SURGERY

## 2022-12-26 PROCEDURE — 250N000011 HC RX IP 250 OP 636

## 2022-12-26 PROCEDURE — 250N000013 HC RX MED GY IP 250 OP 250 PS 637

## 2022-12-26 RX ORDER — METHYLPREDNISOLONE 4 MG
TABLET, DOSE PACK ORAL
Qty: 21 TABLET | Refills: 0 | Status: SHIPPED | OUTPATIENT
Start: 2022-12-26

## 2022-12-26 RX ORDER — METHOCARBAMOL 750 MG/1
750 TABLET, FILM COATED ORAL EVERY 6 HOURS PRN
Qty: 30 TABLET | Refills: 0 | Status: SHIPPED | OUTPATIENT
Start: 2022-12-26

## 2022-12-26 RX ORDER — ACETAMINOPHEN 325 MG/1
975 TABLET ORAL EVERY 8 HOURS
Qty: 100 TABLET | Refills: 0 | Status: SHIPPED | OUTPATIENT
Start: 2022-12-26

## 2022-12-26 RX ORDER — HYDROXYZINE HYDROCHLORIDE 25 MG/1
25 TABLET, FILM COATED ORAL EVERY 6 HOURS PRN
Qty: 30 TABLET | Refills: 0 | Status: SHIPPED | OUTPATIENT
Start: 2022-12-26

## 2022-12-26 RX ORDER — ONDANSETRON 4 MG/1
4 TABLET, ORALLY DISINTEGRATING ORAL EVERY 6 HOURS PRN
Qty: 10 TABLET | Refills: 0 | Status: SHIPPED | OUTPATIENT
Start: 2022-12-26

## 2022-12-26 RX ORDER — AMOXICILLIN 250 MG
1 CAPSULE ORAL 2 TIMES DAILY
Qty: 30 TABLET | Refills: 0 | Status: SHIPPED | OUTPATIENT
Start: 2022-12-26

## 2022-12-26 RX ORDER — HYDROMORPHONE HYDROCHLORIDE 2 MG/1
2-4 TABLET ORAL
Qty: 24 TABLET | Refills: 0 | Status: SHIPPED | OUTPATIENT
Start: 2022-12-26

## 2022-12-26 RX ADMIN — OXCARBAZEPINE 150 MG: 150 TABLET, FILM COATED ORAL at 21:55

## 2022-12-26 RX ADMIN — METHOCARBAMOL 750 MG: 750 TABLET, FILM COATED ORAL at 01:00

## 2022-12-26 RX ADMIN — SENNOSIDES AND DOCUSATE SODIUM 1 TABLET: 50; 8.6 TABLET ORAL at 09:53

## 2022-12-26 RX ADMIN — HYDROMORPHONE HYDROCHLORIDE 4 MG: 4 TABLET ORAL at 11:30

## 2022-12-26 RX ADMIN — HYDROMORPHONE HYDROCHLORIDE 4 MG: 4 TABLET ORAL at 01:00

## 2022-12-26 RX ADMIN — HYDROXYZINE HYDROCHLORIDE 25 MG: 25 TABLET, FILM COATED ORAL at 20:55

## 2022-12-26 RX ADMIN — BUPROPION 150 MG: 150 TABLET, EXTENDED RELEASE ORAL at 07:46

## 2022-12-26 RX ADMIN — METHOCARBAMOL 750 MG: 750 TABLET, FILM COATED ORAL at 17:40

## 2022-12-26 RX ADMIN — LITHIUM CARBONATE 300 MG: 150 CAPSULE ORAL at 21:54

## 2022-12-26 RX ADMIN — BUPROPION 300 MG: 150 TABLET, EXTENDED RELEASE ORAL at 07:46

## 2022-12-26 RX ADMIN — DEXTROAMPHETAMINE SACCHARATE, AMPHETAMINE ASPARTATE MONOHYDRATE, DEXTROAMPHETAMINE SULFATE, AND AMPHETAMINE SULFATE 20 MG: 2.5; 2.5; 2.5; 2.5 CAPSULE, EXTENDED RELEASE ORAL at 07:46

## 2022-12-26 RX ADMIN — HYDROMORPHONE HYDROCHLORIDE 4 MG: 4 TABLET ORAL at 17:40

## 2022-12-26 RX ADMIN — HYDROMORPHONE HYDROCHLORIDE 4 MG: 4 TABLET ORAL at 20:55

## 2022-12-26 RX ADMIN — HYDROXYZINE HYDROCHLORIDE 25 MG: 25 TABLET, FILM COATED ORAL at 04:05

## 2022-12-26 RX ADMIN — ONDANSETRON 4 MG: 4 TABLET, ORALLY DISINTEGRATING ORAL at 20:07

## 2022-12-26 RX ADMIN — LAMOTRIGINE 50 MG: 100 TABLET ORAL at 21:54

## 2022-12-26 RX ADMIN — METHYLPREDNISOLONE 8 MG: 4 TABLET ORAL at 09:53

## 2022-12-26 RX ADMIN — THERA TABS 1 TABLET: TAB at 09:53

## 2022-12-26 RX ADMIN — ACETAMINOPHEN 975 MG: 325 TABLET ORAL at 13:43

## 2022-12-26 RX ADMIN — HYDROMORPHONE HYDROCHLORIDE 4 MG: 4 TABLET ORAL at 04:04

## 2022-12-26 RX ADMIN — HYDROMORPHONE HYDROCHLORIDE 4 MG: 4 TABLET ORAL at 14:26

## 2022-12-26 RX ADMIN — SENNOSIDES AND DOCUSATE SODIUM 1 TABLET: 50; 8.6 TABLET ORAL at 21:54

## 2022-12-26 RX ADMIN — ACETAMINOPHEN 975 MG: 325 TABLET ORAL at 20:55

## 2022-12-26 RX ADMIN — METHYLPREDNISOLONE 4 MG: 4 TABLET ORAL at 11:33

## 2022-12-26 RX ADMIN — METHOCARBAMOL 750 MG: 750 TABLET, FILM COATED ORAL at 07:46

## 2022-12-26 RX ADMIN — HYDROXYZINE HYDROCHLORIDE 25 MG: 25 TABLET, FILM COATED ORAL at 11:30

## 2022-12-26 RX ADMIN — POLYETHYLENE GLYCOL 3350 17 G: 17 POWDER, FOR SOLUTION ORAL at 09:54

## 2022-12-26 RX ADMIN — ONDANSETRON 4 MG: 2 INJECTION INTRAMUSCULAR; INTRAVENOUS at 09:00

## 2022-12-26 RX ADMIN — HYDROMORPHONE HYDROCHLORIDE 4 MG: 4 TABLET ORAL at 07:46

## 2022-12-26 RX ADMIN — ACETAMINOPHEN 975 MG: 325 TABLET ORAL at 09:53

## 2022-12-26 ASSESSMENT — ACTIVITIES OF DAILY LIVING (ADL)
ADLS_ACUITY_SCORE: 25

## 2022-12-26 NOTE — PROGRESS NOTES
[facility] Progress Note    Medications re-sent to [facility] on [address]. Prescriber may need to call pharmacy to discontinue original orders. Unable to rx narcotic through e-prescribe.    Michelle Garcia PA-C  [facility]   [phone]   December 26, 2022 at 10:44 AM

## 2022-12-26 NOTE — PLAN OF CARE
"Pt reviewed paperwork and AVS with discharge nurse. Pt was to discharge home this evening with spouse, but had insurance issues with provider for coverage of rx meds outpatient pharmacy. Pt managing pain with PRN PO dilaudid, robaxin, atarax. Pt reports some \"burning\" discomfort to bilateral shoulders, encouraging rest, cold packs, repositioning. Pt likely staying overnight due to difficulty filling dilaudid rx d/t insurance issue; [facility] notified and working on resolving prescriber issue.                           "

## 2022-12-26 NOTE — PLAN OF CARE
Problem: Plan of Care - These are the overarching goals to be used throughout the patient stay.    Goal: Optimal Comfort and Wellbeing  Intervention: Monitor Pain and Promote Comfort  Recent Flowsheet Documentation  Taken 12/26/2022 0404 by Jacquie Lawson, RN  Pain Management Interventions: medication (see MAR)  Taken 12/26/2022 0100 by Jacquie Lawson, RN  Pain Management Interventions: medication (see MAR)    Patient vital signs are at baseline: Yes  Patient able to ambulate as they were prior to admission or with assist devices provided by therapies during their stay:  Yes  Patient MUST void prior to discharge:  Yes  Patient able to tolerate oral intake:  Yes  Pain has adequate pain control using Oral analgesics:  Yes  Does patient have an identified :  Yes  Has goal D/C date and time been discussed with patient:  Yes    Pt A&Ox4. VSS on RA. CMS intact except baseline tingling to BUE, improving. Dressing c/d/i. Voiding adequately. Up with sba. Pain managed with PRN dilaudid, atarax and robaxin. No IV dilaudid use overnight. Plan to discharge home today.

## 2022-12-26 NOTE — PROGRESS NOTES
Care Management Discharge Note    Discharge Date: 12/26/2022       Discharge Disposition: Home    Discharge Services: None    Discharge DME: None    Discharge Transportation: family or friend will provide    Education Provided on the Discharge Plan:  AVS per bedside nurse.     Persons Notified of Discharge Plans: Patient    Patient/Family in Agreement with the Plan: yes    Additional Information:  CM met with patient.  Patient to discharge to home; family to transport home at time of discharge.  Patient denied any CM needs.  No further CM needs identified.        Lorena Parker RN

## 2022-12-26 NOTE — PROGRESS NOTES
"Orthopedic Progress Note      Assessment: 4 Days Post-Op  S/P Procedure(s):  CERVICAL 5-6 ANTERIOR CERVICAL DECOMPRESSION AND FUSION    Plan:   -Continue with pain management. Continue with PO dilaudid 1-2mg Q3H prn for pain.   -PT/OT: ambulate as tolerated. WBAT.  -Diet per protocol.  -Avoid bending, lifting, & twisting x6 weeks.  -DVT prophylaxis with SCDs, marifer stockings and early ambulation.  -No NSAIDS (advil/ibuprofen, aleve/naproxen, celebrex) for 3 months post-op.  -No aspirin for 5 days post-op.  -Encourage IS.  -Soft cervical collar to be worn for 6 weeks post-op as often as possible. Can remove for hygiene and meals.  -Discharge today      Subjective:  Pain: moderate  Nausea, Vomiting:  no  Lightheadedness, Dizziness:  no  Neuro:  Patient denies new onset numbness or paresthesias    Patient is doing better today. Pain has been improving and she is more comfortable today. She did not require any IV pain medications overnight. She has intermittent nausea which is well controlled with zofran. No other complaints. Feels ready to discharge today.     Objective:  /61 (BP Location: Left arm)   Pulse 76   Temp 98.2  F (36.8  C) (Oral)   Resp 18   Ht 1.575 m (5' 2\")   Wt 66.8 kg (147 lb 3.2 oz)   SpO2 98%   BMI 26.92 kg/m    Incision: Covered. Gauze dressing in place, c/d/i without strike through.   Awake, alert, and oriented. Pleasant. Non-labored breathing.     UPPER EXTREMITIES  RUE 5/5 Biceps, 5/5 triceps, 5/5 wrist extension, 5/5 wrist flexion, 5/5 finger abduction, 5/5  strength. RUE C5-8 intact to light touch  LUE 5/5 Biceps, 5/5 triceps, 5/5 wrist extension, 5/5 wrist flexion, 5/5 finger abduction, 5/5  strength. LUE C5-8 intact to light touch  Strong bilateral radial pulses. Hands warm and well perfused.       Pertinent Labs   Lab Results: personally reviewed.   No results found for: INR, PROTIME  Lab Results   Component Value Date    HGB 10.8 (L) 12/24/2022     No results found for: " NA, CO2      Report completed by:  Lashell Mclain PA-C, YUE  Date: 12/26/2022  Time: 8:05 AM

## 2022-12-26 NOTE — PLAN OF CARE
Speech Language Therapy Discharge Summary    Reason for therapy discharge:    All goals and outcomes met, no further needs identified.    Progress towards therapy goal(s). See goals on Care Plan in [facility] electronic health record for goal details.  Goals met    Therapy recommendation(s):    No further therapy is recommended.    Goal Outcome Evaluation:  Pt eating late lunch of morales and omlette. She demonstrated small pieces of morales and omelette was soft enough to swallow easily. SHe reports that she is taking smaller bites and using liquids if too dry. Swallow did not appear effortful. NO overt sx's aspiration noted with pt taking small bites and small sips of thin from straw. PLAN: DC ST with pt to continue taking small bites and sips and avoid dry foods until sx's are back to baseline and to call her primary MD if sx's worsen. Pt verbalized understanding.

## 2022-12-26 NOTE — PLAN OF CARE
Problem: Pain Acute  Goal: Optimal Pain Control and Function  Outcome: Progressing  Intervention: Develop Pain Management Plan  Recent Flowsheet Documentation  Taken 12/26/2022 0830 by Maite Domínguez RN  Pain Management Interventions: medication (see MAR)  Intervention: Prevent or Manage Pain  Recent Flowsheet Documentation  Taken 12/26/2022 0830 by Maite Domínguez, RN  Medication Review/Management: medications reviewed   Goal Outcome Evaluation: Patient A/O x4. C/o neck pain 4/10 upon initial assessment. Dressing to surgical site changed. Incision is approximated and intact. Site covered with 2x2 gauze and Tegaderm per surgical PA request. At 1130 patient c/o neck pain 7/10. Requested PRN diludid 4mg and hydroxyzine 25mg. Also given ice pack application. Scheduled to be discharged home this afternoon.

## 2022-12-27 VITALS
BODY MASS INDEX: 27.09 KG/M2 | RESPIRATION RATE: 16 BRPM | HEIGHT: 62 IN | SYSTOLIC BLOOD PRESSURE: 121 MMHG | DIASTOLIC BLOOD PRESSURE: 72 MMHG | OXYGEN SATURATION: 95 % | WEIGHT: 147.2 LBS | HEART RATE: 78 BPM | TEMPERATURE: 97.1 F

## 2022-12-27 LAB — HGB BLD-MCNC: 12.5 G/DL (ref 11.7–15.7)

## 2022-12-27 PROCEDURE — 36415 COLL VENOUS BLD VENIPUNCTURE: CPT | Performed by: PHYSICIAN ASSISTANT

## 2022-12-27 PROCEDURE — 250N000013 HC RX MED GY IP 250 OP 250 PS 637

## 2022-12-27 PROCEDURE — 250N000012 HC RX MED GY IP 250 OP 636 PS 637: Performed by: ORTHOPAEDIC SURGERY

## 2022-12-27 PROCEDURE — 85018 HEMOGLOBIN: CPT | Performed by: PHYSICIAN ASSISTANT

## 2022-12-27 RX ADMIN — HYDROMORPHONE HYDROCHLORIDE 4 MG: 4 TABLET ORAL at 00:35

## 2022-12-27 RX ADMIN — HYDROMORPHONE HYDROCHLORIDE 4 MG: 4 TABLET ORAL at 10:48

## 2022-12-27 RX ADMIN — METHOCARBAMOL 750 MG: 750 TABLET, FILM COATED ORAL at 07:35

## 2022-12-27 RX ADMIN — METHOCARBAMOL 750 MG: 750 TABLET, FILM COATED ORAL at 00:35

## 2022-12-27 RX ADMIN — HYDROMORPHONE HYDROCHLORIDE 4 MG: 4 TABLET ORAL at 07:35

## 2022-12-27 RX ADMIN — METHYLPREDNISOLONE 4 MG: 4 TABLET ORAL at 07:05

## 2022-12-27 RX ADMIN — HYDROXYZINE HYDROCHLORIDE 25 MG: 25 TABLET, FILM COATED ORAL at 10:48

## 2022-12-27 RX ADMIN — HYDROMORPHONE HYDROCHLORIDE 4 MG: 4 TABLET ORAL at 04:15

## 2022-12-27 RX ADMIN — MAGNESIUM HYDROXIDE 30 ML: 400 SUSPENSION ORAL at 00:42

## 2022-12-27 RX ADMIN — BUPROPION 300 MG: 150 TABLET, EXTENDED RELEASE ORAL at 07:04

## 2022-12-27 RX ADMIN — ACETAMINOPHEN 975 MG: 325 TABLET ORAL at 09:54

## 2022-12-27 RX ADMIN — BUPROPION 150 MG: 150 TABLET, EXTENDED RELEASE ORAL at 07:05

## 2022-12-27 RX ADMIN — DEXTROAMPHETAMINE SACCHARATE, AMPHETAMINE ASPARTATE MONOHYDRATE, DEXTROAMPHETAMINE SULFATE, AND AMPHETAMINE SULFATE 20 MG: 2.5; 2.5; 2.5; 2.5 CAPSULE, EXTENDED RELEASE ORAL at 07:04

## 2022-12-27 RX ADMIN — THERA TABS 1 TABLET: TAB at 09:55

## 2022-12-27 RX ADMIN — SENNOSIDES AND DOCUSATE SODIUM 1 TABLET: 50; 8.6 TABLET ORAL at 09:55

## 2022-12-27 RX ADMIN — POLYETHYLENE GLYCOL 3350 17 G: 17 POWDER, FOR SOLUTION ORAL at 09:55

## 2022-12-27 RX ADMIN — HYDROXYZINE HYDROCHLORIDE 25 MG: 25 TABLET, FILM COATED ORAL at 04:15

## 2022-12-27 ASSESSMENT — ACTIVITIES OF DAILY LIVING (ADL)
ADLS_ACUITY_SCORE: 25

## 2022-12-27 NOTE — PROGRESS NOTES
"Addendum: Hgb 12.5. Okay to discharge.    Cinda Daly PA-C on 12/27/2022 at 12:11 PM      Orthopedic Progress Note      Assessment: 5 Days Post-Op  S/P Procedure(s):  CERVICAL 5-6 ANTERIOR CERVICAL DECOMPRESSION AND FUSION    Plan:   -Will recheck hgb before discharge.  -Continue with pain management. Continue with PO dilaudid 1-2mg Q3H prn for pain.   -PT/OT: ambulate as tolerated. WBAT.  -Diet per protocol.  -Avoid bending, lifting, & twisting x6 weeks.  -DVT prophylaxis with SCDs, marifer stockings and early ambulation.  -No NSAIDS (advil/ibuprofen, aleve/naproxen, celebrex) for 3 months post-op.  -No aspirin for 5 days post-op.  -Encourage IS.  -Soft cervical collar to be worn for 6 weeks post-op as often as possible. Can remove for hygiene and meals.  -Discharge today. Per patient, meds have been filled at her pharmacy in [place] and no further issues.    Subjective:  Pain: moderate  Nausea, Vomiting:  no  Lightheadedness, Dizziness:  no  Neuro:  Patient denies new onset numbness or paresthesias in bilateral upper extremities    Patient is doing well today. Swallowing solids and liquids without difficulties. No breathing issues. No new concerns. Discharge delayed due to inability to fill prescription medication in [place] at her pharmacy as she [place].    Objective:  /72 (BP Location: Left arm)   Pulse 78   Temp 97.1  F (36.2  C) (Oral)   Resp 16   Ht 1.575 m (5' 2\")   Wt 66.8 kg (147 lb 3.2 oz)   SpO2 95%   BMI 26.92 kg/m    Incision: C/D/I. No erythema. No drainage. No hematoma.  Awake, alert, and oriented. Pleasant. Non-labored breathing.  Calfs soft & non-tender.     UPPER EXTREMITIES  RUE 5/5 Biceps, 5/5 triceps, 5/5 wrist extension, 5/5 wrist flexion, 5/5 finger abduction, 5/5  strength. RUE C5-8 intact to light touch  LUE 5/5 Biceps, 5/5 triceps, 5/5 wrist extension, 5/5 wrist flexion, 5/5 finger abduction, 5/5  strength. LUE C5-8 intact to light touch  Strong bilateral radial " pulses. Hands warm and well perfused.     Pertinent Labs   Lab Results: personally reviewed.   No results found for: INR, PROTIME  Lab Results   Component Value Date    HGB 10.8 (L) 12/24/2022     No results found for: NA, CO2    Cinda Daly PA-C on 12/27/2022 at 10:43 AM  [facility]

## 2022-12-27 NOTE — PLAN OF CARE
Problem: Pain Acute  Goal: Optimal Pain Control and Function  Outcome: Progressing; patient reports pain management is sufficient with oral pain medication interventions. Patient has not had a BM since 12/21/22 and took PRN Milk of magnesia to help improve bowel function.   0415 patient reports pain 6 out of 10 and received Dilaudid 4 mg tablet and hydroxyzine 25 mg.      Patient vital signs are at baseline: Yes  Patient able to ambulate as they were prior to admission or with assist devices provided by therapies during their stay:  Yes  Patient MUST void prior to discharge:  Yes  Patient able to tolerate oral intake:  Yes  Pain has adequate pain control using Oral analgesics:  Yes  Does patient have an identified :  Yes  Has goal D/C date and time been discussed with patient:  Yes; patient's discharge has been delayed related to medication prescription issues.

## 2022-12-27 NOTE — DISCHARGE SUMMARY
"ORTHOPEDIC DISCHARGE SUMMARY       Yane Delgado,  1980, MRN [id number]    Admission Date: 2022      Admission Diagnoses: Cervical radicular pain [M54.12]  Cervical stenosis of spinal canal [M48.02]     Discharge Date:  2022    Post-operative Day:  5 Days Post-Op    Reason for Admission: The patient was admitted for the following: Procedure(s):  CERVICAL 5-6 ANTERIOR CERVICAL DECOMPRESSION AND FUSION    BRIEF HOSPITAL COURSE   Yane Delgado is a pleasant 42 year old female who underwent the aforementioned procedure with Dr. Angeles on 2022. There were no intraoperative complications and the patient was transferred to the recovery room and later the orthopedic unit in stable condition. Once the patient reached the orthopedic floor our orthopedic pain protocol was implemented along with the following:    Anticoagulation Medications: None  Therapy: PT and OT  Activity: Avoid bending, lifting, & twisting x6 weeks  Bracing: None    Consultations during Admission: None    COMPLICATIONS/SIGNIFICANT FINDINGS    None    DISCHARGE INFORMATION   Condition at discharge: Good  Discharge destination: Home  Patient was seen by myself on the date of discharge.    FOLLOW UP CARE   Follow up with orthopedics in 2 weeks or sooner should the need arise. Ortho will continue to manage pain control, post op anticoagulation and incision care.     Follow up with your PCP for management of chronic medical problems and to evaluate for post op medical complications including constipation, nausea/vomiting, DVT/PE, anemia, changes in blood pressure, fevers/chills, urinary retention and atelectasis/pneumonia.     Subjective   Patient is doing well on POD #1. Pain is well controlled with oral medications. Ambulating. Tolerating oral intake. Voiding.    Physical Exam   /72 (BP Location: Left arm)   Pulse 78   Temp 97.1  F (36.2  C) (Oral)   Resp 16   Ht 1.575 m (5' 2\")   Wt 66.8 kg (147 lb 3.2 oz)   SpO2 " 95%   BMI 26.92 kg/m    Incision: C/D/I. No erythema. No drainage. No hematoma.  Awake, alert, and oriented. Pleasant. Non-labored breathing.  Calfs soft & non-tender.     UPPER EXTREMITIES  RUE 5/5 Biceps, 5/5 triceps, 5/5 wrist extension, 5/5 wrist flexion, 5/5 finger abduction, 5/5  strength. RUE C5-8 intact to light touch  LUE 5/5 Biceps, 5/5 triceps, 5/5 wrist extension, 5/5 wrist flexion, 5/5 finger abduction, 5/5  strength. LUE C5-8 intact to light touch  Strong bilateral radial pulses. Hands warm and well perfused.     Pertinent Results at Discharge     Hemoglobin   Date/Time Value Ref Range Status   12/24/2022 06:13 AM 10.8 (L) 11.7 - 15.7 g/dL Final   12/23/2022 07:52 AM 11.2 (L) 11.7 - 15.7 g/dL Final       Problem List   Principal Problem:    Cervical stenosis of spinal canal      Cinda Daly PA-C/Dr. Angeles  [facility]  [phone]  Date: 12/27/2022  Time: 10:44 AM

## 2022-12-27 NOTE — PLAN OF CARE
Problem: Pain Acute  Goal: Optimal Pain Control and Function  Outcome: Met  Intervention: Develop Pain Management Plan  Recent Flowsheet Documentation  Taken 12/27/2022 0735 by Maite Domínguez, RN  Pain Management Interventions: medication (see MAR)  Intervention: Prevent or Manage Pain  Recent Flowsheet Documentation  Taken 12/27/2022 0735 by Maite Domínguez, RN  Medication Review/Management: medications reviewed   Goal Outcome Evaluation: Patient met goals for discharge. Discharge orders reviewed with patient and .  to transport patient home.

## 2023-02-12 ENCOUNTER — HEALTH MAINTENANCE LETTER (OUTPATIENT)
Age: 43
End: 2023-02-12

## 2023-04-06 ENCOUNTER — MEDICAL CORRESPONDENCE (OUTPATIENT)
Dept: HEALTH INFORMATION MANAGEMENT | Facility: CLINIC | Age: 43
End: 2023-04-06
Payer: MEDICAID

## 2023-04-07 ENCOUNTER — ANCILLARY PROCEDURE (OUTPATIENT)
Dept: GENERAL RADIOLOGY | Facility: CLINIC | Age: 43
End: 2023-04-07
Attending: ORTHOPAEDIC SURGERY
Payer: MEDICAID

## 2023-04-07 DIAGNOSIS — Z48.89 AFTERCARE FOLLOWING SURGERY: ICD-10-CM

## 2023-05-20 ENCOUNTER — HEALTH MAINTENANCE LETTER (OUTPATIENT)
Age: 43
End: 2023-05-20

## 2023-07-06 ENCOUNTER — MEDICAL CORRESPONDENCE (OUTPATIENT)
Dept: HEALTH INFORMATION MANAGEMENT | Facility: CLINIC | Age: 43
End: 2023-07-06
Payer: MEDICAID

## 2023-07-21 ENCOUNTER — ANCILLARY PROCEDURE (OUTPATIENT)
Dept: GENERAL RADIOLOGY | Facility: CLINIC | Age: 43
End: 2023-07-21
Attending: ORTHOPAEDIC SURGERY
Payer: MEDICAID

## 2023-07-21 DIAGNOSIS — Z48.89 AFTERCARE FOLLOWING SURGERY: ICD-10-CM

## 2024-03-10 ENCOUNTER — HEALTH MAINTENANCE LETTER (OUTPATIENT)
Age: 44
End: 2024-03-10

## 2024-09-17 ENCOUNTER — HOSPITAL ENCOUNTER (INPATIENT)
Facility: CLINIC | Age: 44
Setting detail: SURGERY ADMIT
End: 2024-09-17
Attending: ORTHOPAEDIC SURGERY | Admitting: ORTHOPAEDIC SURGERY
Payer: COMMERCIAL

## 2025-03-16 ENCOUNTER — HEALTH MAINTENANCE LETTER (OUTPATIENT)
Age: 45
End: 2025-03-16

## 2025-05-07 ENCOUNTER — TRANSFERRED RECORDS (OUTPATIENT)
Dept: HEALTH INFORMATION MANAGEMENT | Facility: CLINIC | Age: 45
End: 2025-05-07

## 2025-06-08 ENCOUNTER — HEALTH MAINTENANCE LETTER (OUTPATIENT)
Age: 45
End: 2025-06-08

## 2025-07-30 ENCOUNTER — TRANSFERRED RECORDS (OUTPATIENT)
Dept: HEALTH INFORMATION MANAGEMENT | Facility: CLINIC | Age: 45
End: 2025-07-30
Payer: COMMERCIAL

## (undated) DEVICE — DRAPE C-ARMOR 5 SIDED 5523

## (undated) DEVICE — ADH SKIN CLOSURE PREMIERPRO EXOFIN 1.0ML 3470

## (undated) DEVICE — CATH IV ANGIO INTRO 14GA X 1 3/4" 381467

## (undated) DEVICE — DRAPE TOWEL IMPERVIOUS X2 7553

## (undated) DEVICE — ESU PENCIL SMOKE EVAC W/ROCKER SWITCH 0703-047-000

## (undated) DEVICE — GOWN XLG DISP 9545

## (undated) DEVICE — GLOVE BIOGEL PI INDICATOR 8.0 LF 41680

## (undated) DEVICE — PACK MINOR SINGLE BASIN SSK3001

## (undated) DEVICE — GLOVE UNDER INDICATOR PI SZ 7.0 LF 41670

## (undated) DEVICE — PIN DISTRACTION 12MM TI BLUE DP-12-TB

## (undated) DEVICE — SOL NACL 0.9% IRRIG 1000ML BOTTLE 2F7124

## (undated) DEVICE — SUCTION MANIFOLD NEPTUNE 2 SYS 1 PORT 702-025-000

## (undated) DEVICE — RX SURGIFLO HEMOSTATIC MATRIX 8ML 2991

## (undated) DEVICE — GLOVE BIOGEL PI SZ 8.0 40880

## (undated) DEVICE — GLOVE BIOGEL PI SZ 7.0 40870

## (undated) DEVICE — DRAPE STERI TOWEL LG 1010

## (undated) DEVICE — SU MONOCRYL 3-0 PS-2 18" UND MCP497G

## (undated) DEVICE — MARKER SURG SKIN STRL 77734

## (undated) DEVICE — ESU ELEC BLADE 2.75" COATED/INSULATED E1455

## (undated) DEVICE — PLATE GROUNDING ADULT W/CORD 9165L

## (undated) DEVICE — CUSTOM PACK LUMBAR FUSION SNE5BLFHEA

## (undated) DEVICE — SUTURE VICRYL+ 0 27IN CT-1 UND VCP260H

## (undated) DEVICE — SU VICRYL+ 3-0 27IN SH UND VCP416H

## (undated) DEVICE — DRAPE C-ARM 60X42" 1013

## (undated) DEVICE — DRAPE SHEET THYROID 77X123 89255

## (undated) DEVICE — DRSG PRIMAPORE 03 1/8X6" 66000318

## (undated) DEVICE — Device

## (undated) DEVICE — SOL WATER IRRIG 1000ML BOTTLE 2F7114

## (undated) DEVICE — PREP CHLORAPREP W/ORANGE TINT 10.5ML 930715

## (undated) RX ORDER — PROPOFOL 10 MG/ML
INJECTION, EMULSION INTRAVENOUS
Status: DISPENSED
Start: 2022-12-22

## (undated) RX ORDER — LIDOCAINE HYDROCHLORIDE 10 MG/ML
INJECTION, SOLUTION EPIDURAL; INFILTRATION; INTRACAUDAL; PERINEURAL
Status: DISPENSED
Start: 2022-12-22

## (undated) RX ORDER — FENTANYL CITRATE 50 UG/ML
INJECTION, SOLUTION INTRAMUSCULAR; INTRAVENOUS
Status: DISPENSED
Start: 2022-12-22

## (undated) RX ORDER — DEXAMETHASONE SODIUM PHOSPHATE 10 MG/ML
INJECTION, EMULSION INTRAMUSCULAR; INTRAVENOUS
Status: DISPENSED
Start: 2022-12-22